# Patient Record
Sex: FEMALE | Race: WHITE | NOT HISPANIC OR LATINO | Employment: UNEMPLOYED | URBAN - METROPOLITAN AREA
[De-identification: names, ages, dates, MRNs, and addresses within clinical notes are randomized per-mention and may not be internally consistent; named-entity substitution may affect disease eponyms.]

---

## 2017-05-02 LAB — POC BETA-HCG (QUAL): NEGATIVE

## 2017-06-19 LAB — POC BETA-HCG (QUAL): NEGATIVE

## 2017-07-31 ENCOUNTER — HISTORICAL (OUTPATIENT)
Dept: LAB | Facility: HOSPITAL | Age: 32
End: 2017-07-31

## 2017-07-31 LAB
ABS NEUT (OLG): 2.31 X10(3)/MCL (ref 2.1–9.2)
ALBUMIN SERPL-MCNC: 4.1 GM/DL (ref 3.4–5)
ALBUMIN/GLOB SERPL: 1.4 {RATIO}
ALP SERPL-CCNC: 49 UNIT/L (ref 38–126)
ALT SERPL-CCNC: 17 UNIT/L (ref 12–78)
AST SERPL-CCNC: 8 UNIT/L (ref 15–37)
BASOPHILS # BLD AUTO: 0 X10(3)/MCL (ref 0–0.2)
BASOPHILS NFR BLD AUTO: 1 %
BILIRUB SERPL-MCNC: 0.4 MG/DL (ref 0.2–1)
BILIRUBIN DIRECT+TOT PNL SERPL-MCNC: 0.1 MG/DL (ref 0–0.2)
BILIRUBIN DIRECT+TOT PNL SERPL-MCNC: 0.3 MG/DL (ref 0–0.8)
BUN SERPL-MCNC: 14 MG/DL (ref 7–18)
CALCIUM SERPL-MCNC: 9.5 MG/DL (ref 8.5–10.1)
CHLORIDE SERPL-SCNC: 105 MMOL/L (ref 98–107)
CO2 SERPL-SCNC: 28 MMOL/L (ref 21–32)
CREAT SERPL-MCNC: 0.94 MG/DL (ref 0.55–1.02)
EOSINOPHIL # BLD AUTO: 0.3 X10(3)/MCL (ref 0–0.9)
EOSINOPHIL NFR BLD AUTO: 6 %
ERYTHROCYTE [DISTWIDTH] IN BLOOD BY AUTOMATED COUNT: 12.4 % (ref 11.5–17)
GLOBULIN SER-MCNC: 3 GM/DL (ref 2.4–3.5)
GLUCOSE SERPL-MCNC: 73 MG/DL (ref 74–106)
HCT VFR BLD AUTO: 40.6 % (ref 37–47)
HGB BLD-MCNC: 14.1 GM/DL (ref 12–16)
LYMPHOCYTES # BLD AUTO: 2 X10(3)/MCL (ref 0.6–4.6)
LYMPHOCYTES NFR BLD AUTO: 40 %
MCH RBC QN AUTO: 33.2 PG (ref 27–31)
MCHC RBC AUTO-ENTMCNC: 34.7 GM/DL (ref 33–36)
MCV RBC AUTO: 95.5 FL (ref 80–94)
MONOCYTES # BLD AUTO: 0.3 X10(3)/MCL (ref 0.1–1.3)
MONOCYTES NFR BLD AUTO: 7 %
NEUTROPHILS # BLD AUTO: 2.31 X10(3)/MCL (ref 2.1–9.2)
NEUTROPHILS NFR BLD AUTO: 47 %
PLATELET # BLD AUTO: 246 X10(3)/MCL (ref 130–400)
PMV BLD AUTO: 11.8 FL (ref 9.4–12.4)
POC BETA-HCG (QUAL): NEGATIVE
POTASSIUM SERPL-SCNC: 4.3 MMOL/L (ref 3.5–5.1)
PROT SERPL-MCNC: 7.1 GM/DL (ref 6.4–8.2)
RBC # BLD AUTO: 4.25 X10(6)/MCL (ref 4.2–5.4)
SODIUM SERPL-SCNC: 142 MMOL/L (ref 136–145)
TSH SERPL-ACNC: 0.53 MIU/ML (ref 0.36–3.74)
WBC # SPEC AUTO: 5 X10(3)/MCL (ref 4.5–11.5)

## 2017-08-08 ENCOUNTER — HISTORICAL (OUTPATIENT)
Dept: ADMINISTRATIVE | Facility: HOSPITAL | Age: 32
End: 2017-08-08

## 2017-08-08 LAB — GROUP & RH: NORMAL

## 2017-09-21 LAB — POC BETA-HCG (QUAL): NEGATIVE

## 2018-05-29 LAB — POC BETA-HCG (QUAL): NEGATIVE

## 2018-05-30 ENCOUNTER — HISTORICAL (OUTPATIENT)
Dept: LAB | Facility: HOSPITAL | Age: 33
End: 2018-05-30

## 2018-06-11 ENCOUNTER — HISTORICAL (OUTPATIENT)
Dept: RADIOLOGY | Facility: HOSPITAL | Age: 33
End: 2018-06-11

## 2018-06-18 LAB — POC BETA-HCG (QUAL): NEGATIVE

## 2019-02-07 ENCOUNTER — HISTORICAL (OUTPATIENT)
Dept: LAB | Facility: HOSPITAL | Age: 34
End: 2019-02-07

## 2019-02-07 LAB — POC BETA-HCG (QUAL): NEGATIVE

## 2019-02-12 ENCOUNTER — HISTORICAL (OUTPATIENT)
Dept: RADIOLOGY | Facility: HOSPITAL | Age: 34
End: 2019-02-12

## 2019-02-18 LAB — POC BETA-HCG (QUAL): NEGATIVE

## 2019-08-21 LAB
BILIRUB SERPL-MCNC: NEGATIVE MG/DL
BLOOD URINE, POC: NEGATIVE
GLUCOSE UR QL STRIP: NEGATIVE
KETONES UR QL STRIP: NEGATIVE
LEUKOCYTE EST, POC UA: NEGATIVE
NITRITE, POC UA: NEGATIVE
PH, POC UA: 6.5
POC BETA-HCG (QUAL): NEGATIVE
PROTEIN, POC: NEGATIVE
SPECIFIC GRAVITY, POC UA: 1.01
UROBILINOGEN, POC UA: NORMAL

## 2019-08-22 LAB — HUMAN PAPILLOMAVIRUS (HPV): NORMAL

## 2020-07-01 ENCOUNTER — HISTORICAL (OUTPATIENT)
Dept: LAB | Facility: HOSPITAL | Age: 35
End: 2020-07-01

## 2020-07-01 LAB
HAV IGM SERPL QL IA: NONREACTIVE
HBV CORE IGM SERPL QL IA: NONREACTIVE
HBV SURFACE AG SERPL QL IA: NONREACTIVE
HCV AB SERPL QL IA: NONREACTIVE
HEPATITIS PANEL INTERP: NORMAL
HIV 1+2 AB+HIV1 P24 AG SERPL QL IA: NONREACTIVE
POC BETA-HCG (QUAL): NEGATIVE
T PALLIDUM AB SER QL: NONREACTIVE

## 2022-04-11 ENCOUNTER — HISTORICAL (OUTPATIENT)
Dept: ADMINISTRATIVE | Facility: HOSPITAL | Age: 37
End: 2022-04-11

## 2022-04-26 VITALS
DIASTOLIC BLOOD PRESSURE: 62 MMHG | HEIGHT: 59 IN | WEIGHT: 106 LBS | SYSTOLIC BLOOD PRESSURE: 106 MMHG | BODY MASS INDEX: 21.37 KG/M2

## 2022-04-30 NOTE — H&P
HISTORY OF PRESENT ILLNESS:  This is a 32 year old  2, para 2, status post tubal ligation, who has a chief complaint of menorrhagia and heavy periods.  She has a desire for definitive therapy.  She states that since her tubal her periods have gotten worse.  She will be coming to [facility] to undergo diagnostic hysteroscopy, D&C procedure, NovaSure endometrial ablation.    SOCIAL HISTORY:  Notable for cigarette use.  She smokes a half a pack a day.  She is a social drinker.    FAMILY HISTORY:  Breast cancer in maternal aunt and great-grandmother, cervical cancer maternal great aunt, lung cancer maternal aunt, diabetes.    PAST SURGICAL HISTORY:  Tonsillectomy, neck lumpectomy, two  sections, bilateral tubal ligation.    OBSTETRICAL HISTORY:  Two pregnancies, both  sections.    GYNECOLOGICAL HISTORY:  Patient states that over the last year her periods have gotten worse, that her bleeding is becoming heavy, she is soaking through a super absorbent pad, and is afraid to wear light colored clothing during her periods.  She states that she wants to know what can be done and she states she is not interested in hormones.  Patient has opted for an ablation procedure.  She understands the alternatives of hormones versus hysterectomy.  She understands that an ablation has a specific risk.  Our conversation included, but was not limited, to the risk of bleeding and infection.  She will be coming to [facility] to undergo that.    REVIEW OF SYSTEMS:  Noncontributory.  Denies headaches, visual changes, chest pain, shortness of breath, nausea, vomiting, fever, chills, abdominal pain.  Reports dyspareunia and menorrhagia.  Denies depression.    PHYSICAL EXAMINATION:  VITAL SIGNS:  Stable.  Afebrile.   GENERAL:  Alert and oriented times three.   HEART:  Regular rate and rhythm.   LUNGS:  Clear to auscultation bilaterally.   ABDOMEN:  Soft.  Nontender and  nondistended.  No guarding or rebound.   :  Age appropriate vulva, vagina, urethral meatus.  Uterus small and mobile.  No adnexal masses are felt.  No cervical motion tenderness.     Preoperative labs and clearance was performed [facility].  CMP is within normal limits, thyroid studies normal, H&H 14.1 and 40.6.   Most recent PAP smear reviewed from May 2017 and it is normal.    ASSESSMENT/PLAN:  This is a 32 year old  2, para 2, who has menorrhagia symptomatic since tubal ligation, who has a desire to undergo definitive therapy.  Patient will be coming to [facility] same day surgery to undergo diagnostic hysteroscopy, D&C, NovaSure endometrial ablation.  She understands the surgery has major risks involved.  Our conversation included but was not limited to the risk of bleeding, infection, uterine perforation, need for hysterectomy, need for laparotomy, and blood transfusion.  40 to 45 minutes was taken for questions and answers.  Consents signed.  Urine pregnancy test day of surgery, SCDs on call to OR, consent for diagnostic hysteroscopy, D&C, ablation to chart, [facility] clearance on the chart, consent for blood on the chart.        ______________________________  MD DREW Pardo/ISIDRO  DD:  2017  Time:  03:43PM  DT:  2017  Time:  05:24AM  Job #:  [id number]    The H&P was reviewed, the patient was examined, and the following changes to the patients condition are noted:  ______________________________________________________________________________  ______________________________________________________________________________  ______________________________________________________________________________  [  ] No changes to the patient's condition:      ______________________________                                             ___________________  PHYSICIAN SIGNATURE                                                              DATE/TIME

## 2022-04-30 NOTE — OP NOTE
DATE OF SURGERY:    08/08/2017    SURGEON:  Regulo Lemus MD    PREOPERATIVE DIAGNOSIS:  A 32-year-old, multigravida female with symptomatic menorrhagia since having a tubal ligation.    POSTOPERATIVE DIAGNOSIS:  A 32-year-old, multigravida female with symptomatic menorrhagia since having a tubal ligation.    PROCEDURE:  Diagnostic hysteroscopy, D&C, NovaSure endometrial ablation.    ANESTHESIOLOGIST:  MD Amanda    ESTIMATED BLOOD LOSS:  Minimal - 20 ml.    FINDINGS:  8 week size mobile uterus anteverted.  No adnexal masses are felt.  Intrauterine findings include redundant endometrial tissue most likely proliferative in nature and benign appearing.  Age appropriate vulva and vagina.  Urine pregnancy test is negative on the day of surgery.  Cavity length is 4 cm and the width is 3.4 cm.  Cavity impendence testing was passed on the NovaSure device.  Urine is 100 ml clear urine straight cath.  Fluids are crystalloid 1 liter.  No antibiotics.    PROCEDURE IN DETAIL:  Risks, benefits and alternatives to this procedure were explained in detail to the patient.  She verbalized understanding and informed consents were obtained.  She was taken to the operating theater with intravenous fluids running, placed on the Operating Room table in dorsal supine position where general anesthesia was achieved without difficulty.  She was then placed in dorsal lithotomy position and prepped and draped in the usual sterile fashion for the procedure at hand.  The bladder was straight cath with clear urine noted 100 ml.  Bimanual exam performed and findings previously discussed were noted.  Graves speculum was placed in the vagina.  Cervix was visualized and grasped at twelve o'clock position with single-tooth tenaculum.  At this time, the uterus was sounded.  Cavity length was obtained.  Hanks dilators were used and the cervix was dilated to where I could then place the 5 mm Stokes hysteroscope under direct hysteroscopic  visualization into the uterine cavity.  Survey of the cavity revealed the findings previously discussed.  Hysteroscope was removed.  I then dilated the cervix with Hanks dilators to where I could then place the NovaSure device without difficulty.  The antenna was opened.  Cavity width was obtained after doing the up and down, left and right side to side motion.  The cervical collar was advanced.  The cavity impendence testing was performed.  Cavity impendence testing was passed and the device was enabled.  At this time, we fulgurated the cavity with the NovaSure device and the procedure was deemed complete.  At this time, the antenna was removed.  The tenaculum site was noted to be bleeding.  2-0 chromic suture was used in figure-of-eight fashion and hemostasis was achieved.  At this time, I watched the cervix for 2 minutes with no active bleeding.  The procedure was deemed over.  Speculum was removed from the patient's vagina.  The patient was cleaned.  Sponge, lap, instrument and needle counts were correct times two.  The patient was cleaned, awakened and taken to Recovery Room awake and in stable condition.        ______________________________  MD DREW Pardo/BRANDEE  DD:  08/08/2017  Time:  10:42AM  DT:  08/08/2017  Time:  04:06PM  Job #:  [id number]

## 2022-09-22 ENCOUNTER — HISTORICAL (OUTPATIENT)
Dept: ADMINISTRATIVE | Facility: HOSPITAL | Age: 37
End: 2022-09-22

## 2023-01-10 ENCOUNTER — OFFICE VISIT (OUTPATIENT)
Dept: FAMILY MEDICINE | Facility: CLINIC | Age: 38
End: 2023-01-10
Payer: MEDICAID

## 2023-01-10 VITALS
HEIGHT: 59 IN | RESPIRATION RATE: 16 BRPM | BODY MASS INDEX: 25.93 KG/M2 | WEIGHT: 128.63 LBS | SYSTOLIC BLOOD PRESSURE: 109 MMHG | DIASTOLIC BLOOD PRESSURE: 78 MMHG | HEART RATE: 88 BPM | OXYGEN SATURATION: 98 %

## 2023-01-10 DIAGNOSIS — F41.9 ANXIETY AND DEPRESSION: ICD-10-CM

## 2023-01-10 DIAGNOSIS — F32.A ANXIETY AND DEPRESSION: ICD-10-CM

## 2023-01-10 DIAGNOSIS — Z76.89 ENCOUNTER TO ESTABLISH CARE: Primary | ICD-10-CM

## 2023-01-10 PROCEDURE — 3074F SYST BP LT 130 MM HG: CPT | Mod: CPTII,,, | Performed by: NURSE PRACTITIONER

## 2023-01-10 PROCEDURE — 1159F PR MEDICATION LIST DOCUMENTED IN MEDICAL RECORD: ICD-10-PCS | Mod: CPTII,,, | Performed by: NURSE PRACTITIONER

## 2023-01-10 PROCEDURE — 3008F PR BODY MASS INDEX (BMI) DOCUMENTED: ICD-10-PCS | Mod: CPTII,,, | Performed by: NURSE PRACTITIONER

## 2023-01-10 PROCEDURE — 99203 OFFICE O/P NEW LOW 30 MIN: CPT | Mod: ,,, | Performed by: NURSE PRACTITIONER

## 2023-01-10 PROCEDURE — 3008F BODY MASS INDEX DOCD: CPT | Mod: CPTII,,, | Performed by: NURSE PRACTITIONER

## 2023-01-10 PROCEDURE — 3078F PR MOST RECENT DIASTOLIC BLOOD PRESSURE < 80 MM HG: ICD-10-PCS | Mod: CPTII,,, | Performed by: NURSE PRACTITIONER

## 2023-01-10 PROCEDURE — 1159F MED LIST DOCD IN RCRD: CPT | Mod: CPTII,,, | Performed by: NURSE PRACTITIONER

## 2023-01-10 PROCEDURE — 3074F PR MOST RECENT SYSTOLIC BLOOD PRESSURE < 130 MM HG: ICD-10-PCS | Mod: CPTII,,, | Performed by: NURSE PRACTITIONER

## 2023-01-10 PROCEDURE — 3078F DIAST BP <80 MM HG: CPT | Mod: CPTII,,, | Performed by: NURSE PRACTITIONER

## 2023-01-10 PROCEDURE — 99203 PR OFFICE/OUTPT VISIT, NEW, LEVL III, 30-44 MIN: ICD-10-PCS | Mod: ,,, | Performed by: NURSE PRACTITIONER

## 2023-01-10 RX ORDER — METHYLPHENIDATE HYDROCHLORIDE 72 MG/1
1 TABLET, EXTENDED RELEASE ORAL DAILY
COMMUNITY
Start: 2022-12-16

## 2023-01-10 RX ORDER — ACETAMINOPHEN 500 MG
5000 TABLET ORAL DAILY
COMMUNITY
End: 2024-02-15

## 2023-01-10 RX ORDER — ACETYLCYSTEINE 600 MG
1000 CAPSULE ORAL DAILY
COMMUNITY
End: 2024-02-15

## 2023-01-10 RX ORDER — CLONAZEPAM 0.5 MG/1
0.5 TABLET ORAL DAILY
COMMUNITY

## 2023-01-10 RX ORDER — METHYLPHENIDATE HYDROCHLORIDE 5 MG/1
1 TABLET ORAL 2 TIMES DAILY
COMMUNITY
Start: 2022-12-16

## 2023-01-10 RX ORDER — DESVENLAFAXINE 100 MG/1
1 TABLET, EXTENDED RELEASE ORAL DAILY
COMMUNITY
Start: 2022-12-12

## 2023-01-10 RX ORDER — CHOLECALCIFEROL (VITAMIN D3) 125 MCG
1 CAPSULE ORAL DAILY
COMMUNITY
End: 2024-02-15

## 2023-01-10 NOTE — PROGRESS NOTES
SUBJECTIVE:     History of Present Illness      Chief Complaint: Establish Care (No complaints at this time.  Previous PCP in [facility] at [facility].  Last seen June 2022 approximately, no lab drawn.)    HPI:  Patient is a 37 y.o. year old female who presents to clinic fto establish care as a new patient.    Last PCP at [place] [facility] years ago.   Past medical history depression, anxiety, ADHD.  Patient is followed by Psychiatry Maria Guadalupe Ahumada .  Patient reports no medication changes.    Review of Systems:  General: Denies fever, chills, fatigue, myalgias, and change in appetite   Eyes: Denies change in vision, eye redness, eye drainage, eye pain  ENT: Denies ear pain or pressure, rhinorrhea, nasal congestion, sore throat, and trouble swallowing  Resp: Denies wheezing, and shortness of breath   Cardio: Denies chest pain, palpitations, pleuritic pain, and edema   GI: Denies nausea, vomiting, diarrhea, and abdominal pain   : Denies dysuria, hematuria, and discharge   MSK: Denies trauma, joint pain, and trouble ambulating   Neuro: Denies LOC, dizziness, seizure like activity, and focal deficits   Skin: Denies rashes, open lesions and ulcers      Previous History      Review of patient's allergies indicates:  No Known Allergies    Past Medical History:   Diagnosis Date    ADHD (attention deficit hyperactivity disorder)     Anxiety     Bipolar disorder     Depression     Hypersomnia     PTSD (post-traumatic stress disorder)      Current Outpatient Medications   Medication Instructions    acetylcysteine (NAC) 1,000 mg, Oral, Daily    biotin 5,000 mcg TbDL 1 tablet, Oral, Daily    cholecalciferol (vitamin D3) (VITAMIN D3) 5,000 Units, Oral, Daily    clonazePAM (KLONOPIN) 0.5 MG tablet 0.5 tablets, Oral, Daily    desvenlafaxine succinate (PRISTIQ) 100 MG Tb24 1 tablet, Oral, Daily    methylphenidate HCl (RITALIN) 5 MG tablet 1 tablet, Oral, 2 times daily    methylphenidate HCl 72 mg TR24 1  "tablet, Oral, Daily     Past Surgical History:   Procedure Laterality Date    ADENOIDECTOMY       SECTION      CYST REMOVAL Right     right side of neck    TONSILLECTOMY      TUBAL LIGATION      uterine ablation N/A      Family History   Problem Relation Age of Onset    Hypertension Father        Social History     Tobacco Use    Smoking status: Former     Types: Cigarettes    Smokeless tobacco: Never   Substance Use Topics    Alcohol use: Yes     Comment: socially    Drug use: Never        Health Maintenance      Health Maintenance   Topic Date Due    Lipid Panel  Never done    TETANUS VACCINE  2024    Hepatitis C Screening  Completed       OBJECTIVE:     Physical Exam      Vital Signs Reviewed   /78   Pulse 88   Resp 16   Ht 4' 11" (1.499 m)   Wt 58.3 kg (128 lb 9.6 oz)   SpO2 98%   BMI 25.97 kg/m²     Physical Exam:  General: Alert, well nourished, no acute distress, non-toxic appearing.   Eyes: Anicteric sclera, without conjunctival injection, normal lids, no purulent drainage, EOMs grossly intact.   Ears: No tragal tenderness. Tympanic membranes intact, pearly grey, without effusion or erythema and with a positive light reflex.   Mouth: Posterior pharynx without erythema. No exudate, ulcerations, or lesion. No tonsillar swelling.   Neck: Supple, full ROM, no rigidity, no cervical adenopathy.   Cardio: Normal rate and rhythm    Resp: Respirations even and unlabored, clear to auscultation bilaterally.   Abd: No ecchymosis or distension. Normal bowel sounds in all 4 quadrants. No tenderness to palpation. No rebound tenderness or guarding. No CVA tenderness.   Skin: No rashes or open lesions noted.   MSK: No swelling. No abrasions or signs of trauma. Ambulating without assistance.   Neuro: Alert,oriented No focal deficits noted. Facial expressions even.   Psych: Cooperative, Normal affect      Procedures    Procedures     Labs     Results for orders placed or performed in visit on 22 "   POCT urine pregnancy   Result Value Ref Range    POC Beta-HCG (Qual) Negative         Assessment       1. Encounter to establish care    2. Anxiety and depression             Plan         1. Encounter to establish care  - CBC Auto Differential; Future  - Comprehensive Metabolic Panel; Future  - TSH; Future  - Lipid Panel; Future  - Vitamin D; Future  - Hemoglobin A1C; Future    2. Anxiety and depression  - CBC Auto Differential; Future  - Comprehensive Metabolic Panel; Future  - TSH; Future  - Lipid Panel; Future  - Vitamin D; Future  - Hemoglobin A1C; Future  Stable meds per psych denies SI/HI     Medication List with Changes/Refills   Current Medications    ACETYLCYSTEINE (NAC) 600 MG CAP    Take 1,000 mg by mouth once daily.    BIOTIN 5,000 MCG TBDL    Take 1 tablet by mouth once daily.    CHOLECALCIFEROL, VITAMIN D3, (VITAMIN D3) 125 MCG (5,000 UNIT) TAB    Take 5,000 Units by mouth once daily.    CLONAZEPAM (KLONOPIN) 0.5 MG TABLET    Take 0.5 tablets by mouth once daily.    DESVENLAFAXINE SUCCINATE (PRISTIQ) 100 MG TB24    Take 1 tablet by mouth once daily.    METHYLPHENIDATE HCL (RITALIN) 5 MG TABLET    Take 1 tablet by mouth 2 (two) times a day.    METHYLPHENIDATE HCL 72 MG TR24    Take 1 tablet by mouth once daily.           Future Appointments   Date Time Provider Department Center   2/10/2023  9:20 AM ELLA Cabello [facility]   5/16/2023  1:15 PM Regulo Lemus MD [facility]

## 2023-02-06 ENCOUNTER — LAB VISIT (OUTPATIENT)
Dept: LAB | Facility: HOSPITAL | Age: 38
End: 2023-02-06
Attending: NURSE PRACTITIONER
Payer: MEDICAID

## 2023-02-06 DIAGNOSIS — F41.9 ANXIETY AND DEPRESSION: ICD-10-CM

## 2023-02-06 DIAGNOSIS — Z76.89 ENCOUNTER TO ESTABLISH CARE: ICD-10-CM

## 2023-02-06 DIAGNOSIS — F32.A ANXIETY AND DEPRESSION: ICD-10-CM

## 2023-02-06 LAB
ALBUMIN SERPL-MCNC: 4.1 G/DL (ref 3.5–5)
ALBUMIN/GLOB SERPL: 1.5 RATIO (ref 1.1–2)
ALP SERPL-CCNC: 46 UNIT/L (ref 40–150)
ALT SERPL-CCNC: 14 UNIT/L (ref 0–55)
AST SERPL-CCNC: 14 UNIT/L (ref 5–34)
BASOPHILS # BLD AUTO: 0.05 X10(3)/MCL (ref 0–0.2)
BASOPHILS NFR BLD AUTO: 0.8 %
BILIRUBIN DIRECT+TOT PNL SERPL-MCNC: 0.5 MG/DL
BUN SERPL-MCNC: 11.6 MG/DL (ref 7–18.7)
CALCIUM SERPL-MCNC: 9.6 MG/DL (ref 8.4–10.2)
CHLORIDE SERPL-SCNC: 106 MMOL/L (ref 98–107)
CHOLEST SERPL-MCNC: 177 MG/DL
CHOLEST/HDLC SERPL: 3 {RATIO} (ref 0–5)
CO2 SERPL-SCNC: 25 MMOL/L (ref 22–29)
CREAT SERPL-MCNC: 0.71 MG/DL (ref 0.55–1.02)
DEPRECATED CALCIDIOL+CALCIFEROL SERPL-MC: 32.6 NG/ML (ref 30–80)
EOSINOPHIL # BLD AUTO: 0.44 X10(3)/MCL (ref 0–0.9)
EOSINOPHIL NFR BLD AUTO: 7.1 %
ERYTHROCYTE [DISTWIDTH] IN BLOOD BY AUTOMATED COUNT: 11.7 % (ref 11.5–17)
EST. AVERAGE GLUCOSE BLD GHB EST-MCNC: 76.7 MG/DL
GFR SERPLBLD CREATININE-BSD FMLA CKD-EPI: >60 MLS/MIN/1.73/M2
GLOBULIN SER-MCNC: 2.7 GM/DL (ref 2.4–3.5)
GLUCOSE SERPL-MCNC: 101 MG/DL (ref 74–100)
HBA1C MFR BLD: 4.3 %
HCT VFR BLD AUTO: 41.6 % (ref 37–47)
HDLC SERPL-MCNC: 65 MG/DL (ref 35–60)
HGB BLD-MCNC: 13.9 GM/DL (ref 12–16)
IMM GRANULOCYTES # BLD AUTO: 0.01 X10(3)/MCL (ref 0–0.04)
IMM GRANULOCYTES NFR BLD AUTO: 0.2 %
LDLC SERPL CALC-MCNC: 97 MG/DL (ref 50–140)
LYMPHOCYTES # BLD AUTO: 2.44 X10(3)/MCL (ref 0.6–4.6)
LYMPHOCYTES NFR BLD AUTO: 39.6 %
MCH RBC QN AUTO: 31.2 PG
MCHC RBC AUTO-ENTMCNC: 33.4 MG/DL (ref 33–36)
MCV RBC AUTO: 93.5 FL (ref 80–94)
MONOCYTES # BLD AUTO: 0.41 X10(3)/MCL (ref 0.1–1.3)
MONOCYTES NFR BLD AUTO: 6.7 %
NEUTROPHILS # BLD AUTO: 2.81 X10(3)/MCL (ref 2.1–9.2)
NEUTROPHILS NFR BLD AUTO: 45.6 %
PLATELET # BLD AUTO: 277 X10(3)/MCL (ref 130–400)
PMV BLD AUTO: 10.9 FL (ref 7.4–10.4)
POTASSIUM SERPL-SCNC: 3.9 MMOL/L (ref 3.5–5.1)
PROT SERPL-MCNC: 6.8 GM/DL (ref 6.4–8.3)
RBC # BLD AUTO: 4.45 X10(6)/MCL (ref 4.2–5.4)
SODIUM SERPL-SCNC: 140 MMOL/L (ref 136–145)
TRIGL SERPL-MCNC: 74 MG/DL (ref 37–140)
TSH SERPL-ACNC: 0.84 UIU/ML (ref 0.35–4.94)
VLDLC SERPL CALC-MCNC: 15 MG/DL
WBC # SPEC AUTO: 6.2 X10(3)/MCL (ref 4.5–11.5)

## 2023-02-06 PROCEDURE — 82306 VITAMIN D 25 HYDROXY: CPT

## 2023-02-06 PROCEDURE — 36415 COLL VENOUS BLD VENIPUNCTURE: CPT

## 2023-02-06 PROCEDURE — 80053 COMPREHEN METABOLIC PANEL: CPT

## 2023-02-06 PROCEDURE — 83036 HEMOGLOBIN GLYCOSYLATED A1C: CPT

## 2023-02-06 PROCEDURE — 84443 ASSAY THYROID STIM HORMONE: CPT

## 2023-02-06 PROCEDURE — 80061 LIPID PANEL: CPT

## 2023-02-06 PROCEDURE — 85025 COMPLETE CBC W/AUTO DIFF WBC: CPT

## 2023-02-10 ENCOUNTER — OFFICE VISIT (OUTPATIENT)
Dept: FAMILY MEDICINE | Facility: CLINIC | Age: 38
End: 2023-02-10
Payer: MEDICAID

## 2023-02-10 VITALS
RESPIRATION RATE: 16 BRPM | WEIGHT: 135 LBS | BODY MASS INDEX: 27.21 KG/M2 | DIASTOLIC BLOOD PRESSURE: 80 MMHG | HEIGHT: 59 IN | OXYGEN SATURATION: 100 % | HEART RATE: 84 BPM | SYSTOLIC BLOOD PRESSURE: 112 MMHG

## 2023-02-10 DIAGNOSIS — Z00.00 WELLNESS EXAMINATION: Primary | ICD-10-CM

## 2023-02-10 DIAGNOSIS — R53.83 FATIGUE, UNSPECIFIED TYPE: ICD-10-CM

## 2023-02-10 DIAGNOSIS — F41.9 ANXIETY AND DEPRESSION: ICD-10-CM

## 2023-02-10 DIAGNOSIS — F32.A ANXIETY AND DEPRESSION: ICD-10-CM

## 2023-02-10 PROCEDURE — 99395 PREV VISIT EST AGE 18-39: CPT | Mod: ,,, | Performed by: NURSE PRACTITIONER

## 2023-02-10 PROCEDURE — 3008F BODY MASS INDEX DOCD: CPT | Mod: CPTII,,, | Performed by: NURSE PRACTITIONER

## 2023-02-10 PROCEDURE — 3008F PR BODY MASS INDEX (BMI) DOCUMENTED: ICD-10-PCS | Mod: CPTII,,, | Performed by: NURSE PRACTITIONER

## 2023-02-10 PROCEDURE — 3079F DIAST BP 80-89 MM HG: CPT | Mod: CPTII,,, | Performed by: NURSE PRACTITIONER

## 2023-02-10 PROCEDURE — 3079F PR MOST RECENT DIASTOLIC BLOOD PRESSURE 80-89 MM HG: ICD-10-PCS | Mod: CPTII,,, | Performed by: NURSE PRACTITIONER

## 2023-02-10 PROCEDURE — 3074F PR MOST RECENT SYSTOLIC BLOOD PRESSURE < 130 MM HG: ICD-10-PCS | Mod: CPTII,,, | Performed by: NURSE PRACTITIONER

## 2023-02-10 PROCEDURE — 1160F RVW MEDS BY RX/DR IN RCRD: CPT | Mod: CPTII,,, | Performed by: NURSE PRACTITIONER

## 2023-02-10 PROCEDURE — 1160F PR REVIEW ALL MEDS BY PRESCRIBER/CLIN PHARMACIST DOCUMENTED: ICD-10-PCS | Mod: CPTII,,, | Performed by: NURSE PRACTITIONER

## 2023-02-10 PROCEDURE — 1159F MED LIST DOCD IN RCRD: CPT | Mod: CPTII,,, | Performed by: NURSE PRACTITIONER

## 2023-02-10 PROCEDURE — 1159F PR MEDICATION LIST DOCUMENTED IN MEDICAL RECORD: ICD-10-PCS | Mod: CPTII,,, | Performed by: NURSE PRACTITIONER

## 2023-02-10 PROCEDURE — 99395 PR PREVENTIVE VISIT,EST,18-39: ICD-10-PCS | Mod: ,,, | Performed by: NURSE PRACTITIONER

## 2023-02-10 PROCEDURE — 3074F SYST BP LT 130 MM HG: CPT | Mod: CPTII,,, | Performed by: NURSE PRACTITIONER

## 2023-02-10 NOTE — PROGRESS NOTES
SUBJECTIVE:     History of Present Illness      Chief Complaint: wellness (Discuss lab results.  No complaints at this time.)    HPI:  Patient is a 37 y.o. year old female who presents to clinic for annual wellness exam and lab review.  Past medical history depression, anxiety, ADHD.    Patient is followed by Psychiatry Maria Guadalupe Ahumada .    Patient reports no medication changes.  She states that her psychiatrist has reviewed her medication and she still very tired and fatigued all the time.  Patient is currently not working at this time.    Review of Systems:    Review of Systems    12 point review of systems conducted, negative except as stated in the history of present illness. See HPI for details.     Previous History      Review of patient's allergies indicates:   Allergen Reactions    Lamotrigine      Other reaction(s): Not Indicated    Buspar [buspirone] Palpitations       Past Medical History:   Diagnosis Date    ADHD (attention deficit hyperactivity disorder)     Anxiety     Bipolar disorder     Depression     Hypersomnia     PTSD (post-traumatic stress disorder)      Current Outpatient Medications   Medication Instructions    acetylcysteine 1,000 mg, Oral, Daily    biotin 5,000 mcg TbDL 1 tablet, Oral, Daily    cholecalciferol (vitamin D3) 5,000 Units, Oral, Daily    clonazePAM (KLONOPIN) 0.5 MG tablet 0.5 tablets, Oral, Daily    desvenlafaxine succinate (PRISTIQ) 100 MG Tb24 1 tablet, Oral, Daily    methylphenidate HCl (RITALIN) 5 MG tablet 1 tablet, Oral, 2 times daily    methylphenidate HCl 72 mg TR24 1 tablet, Oral, Daily     Past Surgical History:   Procedure Laterality Date    ADENOIDECTOMY       SECTION      CYST REMOVAL Right     right side of neck    TONSILLECTOMY      TUBAL LIGATION      uterine ablation N/A      Family History   Problem Relation Age of Onset    Hypertension Father        Social History     Tobacco Use    Smoking status: Former     Types: Cigarettes    Smokeless tobacco:  "Never   Substance Use Topics    Alcohol use: Yes     Comment: socially    Drug use: Never        Health Maintenance      Health Maintenance   Topic Date Due    TETANUS VACCINE  01/23/2024    Hepatitis C Screening  Completed    Lipid Panel  Completed       OBJECTIVE:     Physical Exam      Vital Signs Reviewed   Visit Vitals  /80   Pulse 84   Resp 16   Ht 4' 11" (1.499 m)   Wt 61.2 kg (135 lb)   SpO2 100%   BMI 27.27 kg/m²       Physical Exam    Physical Exam:  General: Alert, well nourished, no acute distress, non-toxic appearing.   Eyes: Anicteric sclera, without conjunctival injection, normal lids, no purulent drainage, EOMs grossly intact.   Ears: No tragal tenderness. Tympanic membranes intact, pearly grey, without effusion or erythema and with a positive light reflex.   Mouth: Posterior pharynx without erythema. No exudate, ulcerations, or lesion. No tonsillar swelling.   Neck: Supple, full ROM, no rigidity, no cervical adenopathy.   Cardio: Normal rate and rhythm    Resp: Respirations even and unlabored, clear to auscultation bilaterally.   Abd: No ecchymosis or distension. Normal bowel sounds in all 4 quadrants. No tenderness to palpation. No rebound tenderness or guarding. No CVA tenderness.   Skin: No rashes or open lesions noted.   MSK: No swelling. No abrasions or signs of trauma. Ambulating without assistance.   Neuro: Alert,oriented No focal deficits noted. Facial expressions even.   Psych: Cooperative, Normal affect      Procedures    Procedures     Labs     Results for orders placed or performed in visit on 02/06/23   Comprehensive Metabolic Panel   Result Value Ref Range    Sodium Level 140 136 - 145 mmol/L    Potassium Level 3.9 3.5 - 5.1 mmol/L    Chloride 106 98 - 107 mmol/L    Carbon Dioxide 25 22 - 29 mmol/L    Glucose Level 101 (H) 74 - 100 mg/dL    Blood Urea Nitrogen 11.6 7.0 - 18.7 mg/dL    Creatinine 0.71 0.55 - 1.02 mg/dL    Calcium Level Total 9.6 8.4 - 10.2 mg/dL    Protein Total " 6.8 6.4 - 8.3 gm/dL    Albumin Level 4.1 3.5 - 5.0 g/dL    Globulin 2.7 2.4 - 3.5 gm/dL    Albumin/Globulin Ratio 1.5 1.1 - 2.0 ratio    Bilirubin Total 0.5 <=1.5 mg/dL    Alkaline Phosphatase 46 40 - 150 unit/L    Alanine Aminotransferase 14 0 - 55 unit/L    Aspartate Aminotransferase 14 5 - 34 unit/L    eGFR >60 mls/min/1.73/m2   TSH   Result Value Ref Range    Thyroid Stimulating Hormone 0.843 0.350 - 4.940 uIU/mL   Lipid Panel   Result Value Ref Range    Cholesterol Total 177 <=200 mg/dL    HDL Cholesterol 65 (H) 35 - 60 mg/dL    Triglyceride 74 37 - 140 mg/dL    Cholesterol/HDL Ratio 3 0 - 5    Very Low Density Lipoprotein 15     LDL Cholesterol 97.00 50.00 - 140.00 mg/dL   Vitamin D   Result Value Ref Range    Vit D 25 OH 32.6 30.0 - 80.0 ng/mL   Hemoglobin A1C   Result Value Ref Range    Hemoglobin A1c 4.3 <=7.0 %    Estimated Average Glucose 76.7 mg/dL   CBC with Differential   Result Value Ref Range    WBC 6.2 4.5 - 11.5 x10(3)/mcL    RBC 4.45 4.20 - 5.40 x10(6)/mcL    Hgb 13.9 12.0 - 16.0 gm/dL    Hct 41.6 37.0 - 47.0 %    MCV 93.5 80.0 - 94.0 fL    MCH 31.2 pg    MCHC 33.4 33.0 - 36.0 mg/dL    RDW 11.7 11.5 - 17.0 %    Platelet 277 130 - 400 x10(3)/mcL    MPV 10.9 (H) 7.4 - 10.4 fL    Neut % 45.6 %    Lymph % 39.6 %    Mono % 6.7 %    Eos % 7.1 %    Basophil % 0.8 %    Lymph # 2.44 0.6 - 4.6 x10(3)/mcL    Neut # 2.81 2.1 - 9.2 x10(3)/mcL    Mono # 0.41 0.1 - 1.3 x10(3)/mcL    Eos # 0.44 0 - 0.9 x10(3)/mcL    Baso # 0.05 0 - 0.2 x10(3)/mcL    IG# 0.01 0 - 0.04 x10(3)/mcL    IG% 0.2 %       Chemistry:  Lab Results   Component Value Date     02/06/2023    K 3.9 02/06/2023    CHLORIDE 106 02/06/2023    BUN 11.6 02/06/2023    CREATININE 0.71 02/06/2023    EGFRNORACEVR >60 02/06/2023    GLUCOSE 101 (H) 02/06/2023    CALCIUM 9.6 02/06/2023    ALKPHOS 46 02/06/2023    LABPROT 6.8 02/06/2023    ALBUMIN 4.1 02/06/2023    BILIDIR 0.13 05/12/2018    IBILI 0.27 05/12/2018    AST 14 02/06/2023    ALT 14  02/06/2023    [id number] 32.6 02/06/2023    TSH 0.843 02/06/2023        Lab Results   Component Value Date    HGBA1C 4.3 02/06/2023        Hematology:  Lab Results   Component Value Date    WBC 6.2 02/06/2023    HGB 13.9 02/06/2023    HCT 41.6 02/06/2023     02/06/2023       Lipid Panel:  Lab Results   Component Value Date    CHOL 177 02/06/2023    HDL 65 (H) 02/06/2023    LDL 97.00 02/06/2023    TRIG 74 02/06/2023    TOTALCHOLEST 3 02/06/2023        Urine:  Lab Results   Component Value Date    APPEARANCEUA Clear 09/08/2018    PROTEINUA Negative 09/08/2018    LEUKOCYTESUR Negative 08/21/2019    RBCUA None Seen 09/08/2018    WBCUA None Seen 09/08/2018    BACTERIA None Seen 09/08/2018         Assessment       1. Wellness examination    2. Anxiety and depression    3. Fatigue, unspecified type           Plan       1. Wellness examination  - Ambulatory referral/consult to Cardiology; Future  Discussed labs and preventative screenings   Overall health status reviewed.    Significant chronic conditions addressed, including ongoing treatment plans.   Good health habits reinforced.    Cardiovascular disease risk factors discussed.   Appropriate recommendations and preventative care medical   information provided with annual wellness exams encouraged.  Vaccination status  Cervical  UTD - appointment with GYN     2. Anxiety and depression  Denies SI, HI hallucinations.  Continue current medication as prescribed by psychiatrist.  Establish good family social support.    3. Fatigue, unspecified type  Labs wnl    Refer to Cardio and GYN for further testing   - Ambulatory referral/consult to Cardiology; Future  Orders Placed This Encounter    Ambulatory referral/consult to Cardiology      Medication List with Changes/Refills   Current Medications    ACETYLCYSTEINE 600 MG CAP    Take 1,000 mg by mouth once daily.    BIOTIN 5,000 MCG TBDL    Take 1 tablet by mouth once daily.    CHOLECALCIFEROL, VITAMIN D3, 125 MCG (5,000  UNIT) TAB    Take 5,000 Units by mouth once daily.    CLONAZEPAM (KLONOPIN) 0.5 MG TABLET    Take 0.5 tablets by mouth once daily.    DESVENLAFAXINE SUCCINATE (PRISTIQ) 100 MG TB24    Take 1 tablet by mouth once daily.    METHYLPHENIDATE HCL (RITALIN) 5 MG TABLET    Take 1 tablet by mouth 2 (two) times a day.    METHYLPHENIDATE HCL 72 MG TR24    Take 1 tablet by mouth once daily.       Follow up in about 6 months (around 8/10/2023).     Future Appointments   Date Time Provider Department Center   5/16/2023  1:15 PM Regulo Lemus MD [facility]   8/15/2023  8:00 [facility]   2/12/2024  8:00 [facility]

## 2023-02-16 ENCOUNTER — PATIENT MESSAGE (OUTPATIENT)
Dept: FAMILY MEDICINE | Facility: CLINIC | Age: 38
End: 2023-02-16
Payer: MEDICAID

## 2023-02-23 RX ORDER — MUPIROCIN 20 MG/G
OINTMENT TOPICAL 3 TIMES DAILY
Qty: 15 G | Refills: 0 | Status: SHIPPED | OUTPATIENT
Start: 2023-02-23 | End: 2024-02-15

## 2023-03-02 ENCOUNTER — PATIENT MESSAGE (OUTPATIENT)
Dept: ADMINISTRATIVE | Facility: HOSPITAL | Age: 38
End: 2023-03-02
Payer: MEDICAID

## 2023-03-09 ENCOUNTER — OFFICE VISIT (OUTPATIENT)
Dept: FAMILY MEDICINE | Facility: CLINIC | Age: 38
End: 2023-03-09
Payer: MEDICAID

## 2023-03-09 VITALS
WEIGHT: 134 LBS | HEIGHT: 59 IN | BODY MASS INDEX: 27.01 KG/M2 | OXYGEN SATURATION: 97 % | HEART RATE: 68 BPM | RESPIRATION RATE: 18 BRPM | DIASTOLIC BLOOD PRESSURE: 83 MMHG | SYSTOLIC BLOOD PRESSURE: 119 MMHG

## 2023-03-09 DIAGNOSIS — R42 VERTIGO: Primary | ICD-10-CM

## 2023-03-09 DIAGNOSIS — H65.199 ACUTE MIDDLE EAR EFFUSION, UNSPECIFIED LATERALITY: ICD-10-CM

## 2023-03-09 PROCEDURE — 3074F PR MOST RECENT SYSTOLIC BLOOD PRESSURE < 130 MM HG: ICD-10-PCS | Mod: CPTII,,, | Performed by: NURSE PRACTITIONER

## 2023-03-09 PROCEDURE — 1159F PR MEDICATION LIST DOCUMENTED IN MEDICAL RECORD: ICD-10-PCS | Mod: CPTII,,, | Performed by: NURSE PRACTITIONER

## 2023-03-09 PROCEDURE — 99213 OFFICE O/P EST LOW 20 MIN: CPT | Mod: ,,, | Performed by: NURSE PRACTITIONER

## 2023-03-09 PROCEDURE — 99213 PR OFFICE/OUTPT VISIT, EST, LEVL III, 20-29 MIN: ICD-10-PCS | Mod: ,,, | Performed by: NURSE PRACTITIONER

## 2023-03-09 PROCEDURE — 3008F BODY MASS INDEX DOCD: CPT | Mod: CPTII,,, | Performed by: NURSE PRACTITIONER

## 2023-03-09 PROCEDURE — 1159F MED LIST DOCD IN RCRD: CPT | Mod: CPTII,,, | Performed by: NURSE PRACTITIONER

## 2023-03-09 PROCEDURE — 3008F PR BODY MASS INDEX (BMI) DOCUMENTED: ICD-10-PCS | Mod: CPTII,,, | Performed by: NURSE PRACTITIONER

## 2023-03-09 PROCEDURE — 3074F SYST BP LT 130 MM HG: CPT | Mod: CPTII,,, | Performed by: NURSE PRACTITIONER

## 2023-03-09 PROCEDURE — 3079F PR MOST RECENT DIASTOLIC BLOOD PRESSURE 80-89 MM HG: ICD-10-PCS | Mod: CPTII,,, | Performed by: NURSE PRACTITIONER

## 2023-03-09 PROCEDURE — 3079F DIAST BP 80-89 MM HG: CPT | Mod: CPTII,,, | Performed by: NURSE PRACTITIONER

## 2023-03-09 RX ORDER — AMOXICILLIN 875 MG/1
875 TABLET, FILM COATED ORAL EVERY 12 HOURS
Qty: 14 TABLET | Refills: 0 | Status: SHIPPED | OUTPATIENT
Start: 2023-03-09 | End: 2023-03-16

## 2023-03-09 RX ORDER — CETIRIZINE HYDROCHLORIDE 10 MG/1
10 TABLET ORAL DAILY
Qty: 30 TABLET | Refills: 5 | Status: SHIPPED | OUTPATIENT
Start: 2023-03-09 | End: 2024-02-15 | Stop reason: SDUPTHER

## 2023-03-09 RX ORDER — FLUTICASONE PROPIONATE 50 MCG
1 SPRAY, SUSPENSION (ML) NASAL DAILY
Qty: 16 G | Refills: 0 | Status: SHIPPED | OUTPATIENT
Start: 2023-03-09 | End: 2024-02-15 | Stop reason: SDUPTHER

## 2023-03-09 RX ORDER — TOPIRAMATE 25 MG/1
TABLET ORAL
COMMUNITY
Start: 2023-02-15

## 2023-03-09 RX ORDER — MECLIZINE HCL 12.5 MG 12.5 MG/1
12.5 TABLET ORAL 3 TIMES DAILY PRN
Qty: 15 TABLET | Refills: 0 | Status: SHIPPED | OUTPATIENT
Start: 2023-03-09 | End: 2024-02-15

## 2023-03-09 NOTE — PROGRESS NOTES
SUBJECTIVE:     History of Present Illness      Chief Complaint: Follow-up (Follow-up visit . Pt c/o vertigo, bilateral eye pain and pressure. BL ear pressure. X 1 month and gradually got worse this past weekend.)    HPI:  Patient is a 37 y.o. year old female who presents to clinic for c/o dizziness. She states when she bends her head or turn her head she gets dizzy.   Denies N/V  blurred vision, CP or SOB    She desribeds it as ears feeling full with fluid     Review of Systems:    Review of Systems    12 point review of systems conducted, negative except as stated in the history of present illness. See HPI for details.     Previous History      Review of patient's allergies indicates:   Allergen Reactions    Lamotrigine      Other reaction(s): Not Indicated    Buspar [buspirone] Palpitations       Past Medical History:   Diagnosis Date    ADHD (attention deficit hyperactivity disorder)     Anxiety     Bipolar disorder     Depression     Hypersomnia     PTSD (post-traumatic stress disorder)      Current Outpatient Medications   Medication Instructions    acetylcysteine 1,000 mg, Oral, Daily    amoxicillin (AMOXIL) 875 mg, Oral, Every 12 hours    biotin 5,000 mcg TbDL 1 tablet, Oral, Daily    cetirizine (ZYRTEC) 10 mg, Oral, Daily    cholecalciferol (vitamin D3) 5,000 Units, Oral, Daily    clonazePAM (KLONOPIN) 0.5 MG tablet 0.5 tablets, Oral, Daily    desvenlafaxine succinate (PRISTIQ) 100 MG Tb24 1 tablet, Oral, Daily    fluticasone propionate (FLONASE) 50 mcg, Each Nostril, Daily    meclizine (ANTIVERT) 12.5 mg, Oral, 3 times daily PRN    methylphenidate HCl (RITALIN) 5 MG tablet 1 tablet, Oral, 2 times daily    methylphenidate HCl 72 mg TR24 1 tablet, Oral, Daily    mupirocin (BACTROBAN) 2 % ointment Topical (Top), 3 times daily    topiramate (TOPAMAX) 25 MG tablet No dose, route, or frequency recorded.     Past Surgical History:   Procedure Laterality Date    ADENOIDECTOMY       SECTION      CYST  "REMOVAL Right     right side of neck    TONSILLECTOMY      TUBAL LIGATION      uterine ablation N/A      Family History   Problem Relation Age of Onset    Hypertension Father        Social History     Tobacco Use    Smoking status: Former     Types: Cigarettes    Smokeless tobacco: Never   Substance Use Topics    Alcohol use: Yes     Comment: socially    Drug use: Never        Health Maintenance      Health Maintenance   Topic Date Due    TETANUS VACCINE  01/23/2024    Hepatitis C Screening  Completed    Lipid Panel  Completed       OBJECTIVE:     Physical Exam      Vital Signs Reviewed   Visit Vitals  /83   Pulse 68   Resp 18   Ht 4' 11" (1.499 m)   Wt 60.8 kg (134 lb)   SpO2 97%   BMI 27.06 kg/m²       Physical Exam  HENT:      Right Ear: A middle ear effusion is present.      Left Ear: A middle ear effusion is present.       Physical Exam:  General: Alert, well nourished, no acute distress, non-toxic appearing.   Eyes: Anicteric sclera, without conjunctival injection, normal lids, no purulent drainage, EOMs grossly intact.   Ears: No tragal tenderness. Tympanic membranes intact, pearly grey, without effusion or erythema and with a positive light reflex.   Mouth: Posterior pharynx without erythema. No exudate, ulcerations, or lesion. No tonsillar swelling.   Neck: Supple, full ROM, no rigidity, no cervical adenopathy.   Cardio: Normal rate and rhythm    Resp: Respirations even and unlabored, clear to auscultation bilaterally.   Abd: No ecchymosis or distension. Normal bowel sounds in all 4 quadrants. No tenderness to palpation. No rebound tenderness or guarding. No CVA tenderness.   Skin: No rashes or open lesions noted.   MSK: No swelling. No abrasions or signs of trauma. Ambulating without assistance.   Neuro: Alert,oriented No focal deficits noted. Facial expressions even.   Psych: Cooperative, Normal affect      Procedures    Procedures     Labs     Results for orders placed or performed in visit on " 02/06/23   Comprehensive Metabolic Panel   Result Value Ref Range    Sodium Level 140 136 - 145 mmol/L    Potassium Level 3.9 3.5 - 5.1 mmol/L    Chloride 106 98 - 107 mmol/L    Carbon Dioxide 25 22 - 29 mmol/L    Glucose Level 101 (H) 74 - 100 mg/dL    Blood Urea Nitrogen 11.6 7.0 - 18.7 mg/dL    Creatinine 0.71 0.55 - 1.02 mg/dL    Calcium Level Total 9.6 8.4 - 10.2 mg/dL    Protein Total 6.8 6.4 - 8.3 gm/dL    Albumin Level 4.1 3.5 - 5.0 g/dL    Globulin 2.7 2.4 - 3.5 gm/dL    Albumin/Globulin Ratio 1.5 1.1 - 2.0 ratio    Bilirubin Total 0.5 <=1.5 mg/dL    Alkaline Phosphatase 46 40 - 150 unit/L    Alanine Aminotransferase 14 0 - 55 unit/L    Aspartate Aminotransferase 14 5 - 34 unit/L    eGFR >60 mls/min/1.73/m2   TSH   Result Value Ref Range    Thyroid Stimulating Hormone 0.843 0.350 - 4.940 uIU/mL   Lipid Panel   Result Value Ref Range    Cholesterol Total 177 <=200 mg/dL    HDL Cholesterol 65 (H) 35 - 60 mg/dL    Triglyceride 74 37 - 140 mg/dL    Cholesterol/HDL Ratio 3 0 - 5    Very Low Density Lipoprotein 15     LDL Cholesterol 97.00 50.00 - 140.00 mg/dL   Vitamin D   Result Value Ref Range    Vit D 25 OH 32.6 30.0 - 80.0 ng/mL   Hemoglobin A1C   Result Value Ref Range    Hemoglobin A1c 4.3 <=7.0 %    Estimated Average Glucose 76.7 mg/dL   CBC with Differential   Result Value Ref Range    WBC 6.2 4.5 - 11.5 x10(3)/mcL    RBC 4.45 4.20 - 5.40 x10(6)/mcL    Hgb 13.9 12.0 - 16.0 gm/dL    Hct 41.6 37.0 - 47.0 %    MCV 93.5 80.0 - 94.0 fL    MCH 31.2 pg    MCHC 33.4 33.0 - 36.0 mg/dL    RDW 11.7 11.5 - 17.0 %    Platelet 277 130 - 400 x10(3)/mcL    MPV 10.9 (H) 7.4 - 10.4 fL    Neut % 45.6 %    Lymph % 39.6 %    Mono % 6.7 %    Eos % 7.1 %    Basophil % 0.8 %    Lymph # 2.44 0.6 - 4.6 x10(3)/mcL    Neut # 2.81 2.1 - 9.2 x10(3)/mcL    Mono # 0.41 0.1 - 1.3 x10(3)/mcL    Eos # 0.44 0 - 0.9 x10(3)/mcL    Baso # 0.05 0 - 0.2 x10(3)/mcL    IG# 0.01 0 - 0.04 x10(3)/mcL    IG% 0.2 %       Chemistry:  Lab Results    Component Value Date     02/06/2023    K 3.9 02/06/2023    CHLORIDE 106 02/06/2023    BUN 11.6 02/06/2023    CREATININE 0.71 02/06/2023    EGFRNORACEVR >60 02/06/2023    GLUCOSE 101 (H) 02/06/2023    CALCIUM 9.6 02/06/2023    ALKPHOS 46 02/06/2023    LABPROT 6.8 02/06/2023    ALBUMIN 4.1 02/06/2023    BILIDIR 0.13 05/12/2018    IBILI 0.27 05/12/2018    AST 14 02/06/2023    ALT 14 02/06/2023    [id number] 32.6 02/06/2023    TSH 0.843 02/06/2023        Lab Results   Component Value Date    HGBA1C 4.3 02/06/2023        Hematology:  Lab Results   Component Value Date    WBC 6.2 02/06/2023    HGB 13.9 02/06/2023    HCT 41.6 02/06/2023     02/06/2023       Lipid Panel:  Lab Results   Component Value Date    CHOL 177 02/06/2023    HDL 65 (H) 02/06/2023    LDL 97.00 02/06/2023    TRIG 74 02/06/2023    TOTALCHOLEST 3 02/06/2023        Urine:  Lab Results   Component Value Date    APPEARANCEUA Clear 09/08/2018    PROTEINUA Negative 09/08/2018    LEUKOCYTESUR Negative 08/21/2019    RBCUA None Seen 09/08/2018    WBCUA None Seen 09/08/2018    BACTERIA None Seen 09/08/2018         Assessment       1. Vertigo    2. Acute middle ear effusion, unspecified laterality           Plan       1. Vertigo  Meclizine  PRN   2. Acute middle ear effusion, unspecified laterality  Ceterizne   Amoxacillin   Orders Placed This Encounter    cetirizine (ZYRTEC) 10 MG tablet    meclizine (ANTIVERT) 12.5 mg tablet    fluticasone propionate (FLONASE) 50 mcg/actuation nasal spray    amoxicillin (AMOXIL) 875 MG tablet      Medication List with Changes/Refills   New Medications    AMOXICILLIN (AMOXIL) 875 MG TABLET    Take 1 tablet (875 mg total) by mouth every 12 (twelve) hours. for 7 days    CETIRIZINE (ZYRTEC) 10 MG TABLET    Take 1 tablet (10 mg total) by mouth once daily.    FLUTICASONE PROPIONATE (FLONASE) 50 MCG/ACTUATION NASAL SPRAY    1 spray (50 mcg total) by Each Nostril route once daily.    MECLIZINE (ANTIVERT) 12.5 MG TABLET     Take 1 tablet (12.5 mg total) by mouth 3 (three) times daily as needed for Dizziness.   Current Medications    ACETYLCYSTEINE 600 MG CAP    Take 1,000 mg by mouth once daily.    BIOTIN 5,000 MCG TBDL    Take 1 tablet by mouth once daily.    CHOLECALCIFEROL, VITAMIN D3, 125 MCG (5,000 UNIT) TAB    Take 5,000 Units by mouth once daily.    CLONAZEPAM (KLONOPIN) 0.5 MG TABLET    Take 0.5 tablets by mouth once daily.    DESVENLAFAXINE SUCCINATE (PRISTIQ) 100 MG TB24    Take 1 tablet by mouth once daily.    METHYLPHENIDATE HCL (RITALIN) 5 MG TABLET    Take 1 tablet by mouth 2 (two) times a day.    METHYLPHENIDATE HCL 72 MG TR24    Take 1 tablet by mouth once daily.    MUPIROCIN (BACTROBAN) 2 % OINTMENT    Apply topically 3 (three) times daily.    TOPIRAMATE (TOPAMAX) 25 MG TABLET           No follow-ups on file.     Future Appointments   Date Time Provider Department Center   5/16/2023  1:15 PM Regulo Lemus MD [facility]   8/15/2023  8:00 AM ELLA Cabello [facility]   2/12/2024  8:00 [facility]

## 2023-03-15 ENCOUNTER — PATIENT MESSAGE (OUTPATIENT)
Dept: ADMINISTRATIVE | Facility: HOSPITAL | Age: 38
End: 2023-03-15
Payer: MEDICAID

## 2023-03-22 ENCOUNTER — PATIENT OUTREACH (OUTPATIENT)
Dept: ADMINISTRATIVE | Facility: HOSPITAL | Age: 38
End: 2023-03-22
Payer: MEDICAID

## 2023-03-22 NOTE — PROGRESS NOTES
Population Health Outreach. The following record(s)  below were uploaded for Health Maintenance .    HPV SCREENING 08/22/2019   English

## 2023-05-23 ENCOUNTER — HOSPITAL ENCOUNTER (OUTPATIENT)
Dept: RADIOLOGY | Facility: HOSPITAL | Age: 38
Discharge: HOME | End: 2023-05-23
Attending: OBSTETRICS & GYNECOLOGY
Payer: MEDICAID

## 2023-05-23 DIAGNOSIS — Z12.31 ENCOUNTER FOR SCREENING MAMMOGRAM FOR MALIGNANT NEOPLASM OF BREAST: ICD-10-CM

## 2023-05-23 PROCEDURE — 77063 MAMMO DIGITAL SCREENING BILAT WITH TOMO: ICD-10-PCS | Mod: 26,,, | Performed by: RADIOLOGY

## 2023-05-23 PROCEDURE — 77067 SCR MAMMO BI INCL CAD: CPT | Mod: 26,,, | Performed by: RADIOLOGY

## 2023-05-23 PROCEDURE — 77063 BREAST TOMOSYNTHESIS BI: CPT | Mod: 26,,, | Performed by: RADIOLOGY

## 2023-05-23 PROCEDURE — 77067 SCR MAMMO BI INCL CAD: CPT | Mod: TC

## 2023-05-23 PROCEDURE — 77067 MAMMO DIGITAL SCREENING BILAT WITH TOMO: ICD-10-PCS | Mod: 26,,, | Performed by: RADIOLOGY

## 2023-06-01 ENCOUNTER — HOSPITAL ENCOUNTER (OUTPATIENT)
Dept: RADIOLOGY | Facility: HOSPITAL | Age: 38
Discharge: HOME | End: 2023-06-01
Attending: OBSTETRICS & GYNECOLOGY
Payer: MEDICAID

## 2023-06-01 DIAGNOSIS — R92.8 ABNORMAL SCREENING MAMMOGRAM: ICD-10-CM

## 2023-06-01 PROCEDURE — 77066 DX MAMMO INCL CAD BI: CPT | Mod: 26,,, | Performed by: STUDENT IN AN ORGANIZED HEALTH CARE EDUCATION/TRAINING PROGRAM

## 2023-06-01 PROCEDURE — 76642 ULTRASOUND BREAST LIMITED: CPT | Mod: TC,50

## 2023-06-01 PROCEDURE — 76642 US BREAST BILATERAL LIMITED: ICD-10-PCS | Mod: 26,50,, | Performed by: STUDENT IN AN ORGANIZED HEALTH CARE EDUCATION/TRAINING PROGRAM

## 2023-06-01 PROCEDURE — 77062 MAMMO DIGITAL DIAGNOSTIC BILAT WITH TOMO: ICD-10-PCS | Mod: 26,,, | Performed by: STUDENT IN AN ORGANIZED HEALTH CARE EDUCATION/TRAINING PROGRAM

## 2023-06-01 PROCEDURE — 77066 MAMMO DIGITAL DIAGNOSTIC BILAT WITH TOMO: ICD-10-PCS | Mod: 26,,, | Performed by: STUDENT IN AN ORGANIZED HEALTH CARE EDUCATION/TRAINING PROGRAM

## 2023-06-01 PROCEDURE — 77062 BREAST TOMOSYNTHESIS BI: CPT | Mod: 26,,, | Performed by: STUDENT IN AN ORGANIZED HEALTH CARE EDUCATION/TRAINING PROGRAM

## 2023-06-01 PROCEDURE — 77066 DX MAMMO INCL CAD BI: CPT | Mod: TC

## 2023-06-01 PROCEDURE — 76642 ULTRASOUND BREAST LIMITED: CPT | Mod: 26,50,, | Performed by: STUDENT IN AN ORGANIZED HEALTH CARE EDUCATION/TRAINING PROGRAM

## 2024-01-30 DIAGNOSIS — Z00.00 WELLNESS EXAMINATION: Primary | ICD-10-CM

## 2024-02-12 ENCOUNTER — LAB VISIT (OUTPATIENT)
Dept: LAB | Facility: HOSPITAL | Age: 39
End: 2024-02-12
Attending: NURSE PRACTITIONER
Payer: MEDICAID

## 2024-02-12 DIAGNOSIS — Z00.00 WELLNESS EXAMINATION: ICD-10-CM

## 2024-02-12 LAB
ALBUMIN SERPL-MCNC: 4.1 G/DL (ref 3.5–5)
ALBUMIN/GLOB SERPL: 1.5 RATIO (ref 1.1–2)
ALP SERPL-CCNC: 48 UNIT/L (ref 40–150)
ALT SERPL-CCNC: 11 UNIT/L (ref 0–55)
AST SERPL-CCNC: 14 UNIT/L (ref 5–34)
BASOPHILS # BLD AUTO: 0.04 X10(3)/MCL
BASOPHILS NFR BLD AUTO: 0.7 %
BILIRUB SERPL-MCNC: 0.6 MG/DL
BUN SERPL-MCNC: 9.3 MG/DL (ref 7–18.7)
CALCIUM SERPL-MCNC: 9.2 MG/DL (ref 8.4–10.2)
CHLORIDE SERPL-SCNC: 105 MMOL/L (ref 98–107)
CHOLEST SERPL-MCNC: 157 MG/DL
CHOLEST/HDLC SERPL: 3 {RATIO} (ref 0–5)
CO2 SERPL-SCNC: 23 MMOL/L (ref 22–29)
CREAT SERPL-MCNC: 0.72 MG/DL (ref 0.55–1.02)
DEPRECATED CALCIDIOL+CALCIFEROL SERPL-MC: 26.1 NG/ML (ref 30–80)
EOSINOPHIL # BLD AUTO: 0.4 X10(3)/MCL (ref 0–0.9)
EOSINOPHIL NFR BLD AUTO: 6.9 %
ERYTHROCYTE [DISTWIDTH] IN BLOOD BY AUTOMATED COUNT: 11.9 % (ref 11.5–17)
EST. AVERAGE GLUCOSE BLD GHB EST-MCNC: 79.6 MG/DL
GFR SERPLBLD CREATININE-BSD FMLA CKD-EPI: >60 MLS/MIN/1.73/M2
GLOBULIN SER-MCNC: 2.8 GM/DL (ref 2.4–3.5)
GLUCOSE SERPL-MCNC: 90 MG/DL (ref 74–100)
HBA1C MFR BLD: 4.4 %
HCT VFR BLD AUTO: 40.8 % (ref 37–47)
HDLC SERPL-MCNC: 56 MG/DL (ref 35–60)
HGB BLD-MCNC: 13.9 G/DL (ref 12–16)
IMM GRANULOCYTES # BLD AUTO: 0.02 X10(3)/MCL (ref 0–0.04)
IMM GRANULOCYTES NFR BLD AUTO: 0.3 %
LDLC SERPL CALC-MCNC: 85 MG/DL (ref 50–140)
LYMPHOCYTES # BLD AUTO: 2.03 X10(3)/MCL (ref 0.6–4.6)
LYMPHOCYTES NFR BLD AUTO: 34.9 %
MCH RBC QN AUTO: 31.9 PG (ref 27–31)
MCHC RBC AUTO-ENTMCNC: 34.1 G/DL (ref 33–36)
MCV RBC AUTO: 93.6 FL (ref 80–94)
MONOCYTES # BLD AUTO: 0.28 X10(3)/MCL (ref 0.1–1.3)
MONOCYTES NFR BLD AUTO: 4.8 %
NEUTROPHILS # BLD AUTO: 3.04 X10(3)/MCL (ref 2.1–9.2)
NEUTROPHILS NFR BLD AUTO: 52.4 %
PLATELET # BLD AUTO: 261 X10(3)/MCL (ref 130–400)
PMV BLD AUTO: 11.2 FL (ref 7.4–10.4)
POTASSIUM SERPL-SCNC: 3.4 MMOL/L (ref 3.5–5.1)
PROT SERPL-MCNC: 6.9 GM/DL (ref 6.4–8.3)
RBC # BLD AUTO: 4.36 X10(6)/MCL (ref 4.2–5.4)
SODIUM SERPL-SCNC: 139 MMOL/L (ref 136–145)
TRIGL SERPL-MCNC: 80 MG/DL (ref 37–140)
TSH SERPL-ACNC: 0.76 UIU/ML (ref 0.35–4.94)
VLDLC SERPL CALC-MCNC: 16 MG/DL
WBC # SPEC AUTO: 5.81 X10(3)/MCL (ref 4.5–11.5)

## 2024-02-12 PROCEDURE — 80053 COMPREHEN METABOLIC PANEL: CPT

## 2024-02-12 PROCEDURE — 83036 HEMOGLOBIN GLYCOSYLATED A1C: CPT

## 2024-02-12 PROCEDURE — 80061 LIPID PANEL: CPT

## 2024-02-12 PROCEDURE — 84443 ASSAY THYROID STIM HORMONE: CPT

## 2024-02-12 PROCEDURE — 82306 VITAMIN D 25 HYDROXY: CPT

## 2024-02-12 PROCEDURE — 36415 COLL VENOUS BLD VENIPUNCTURE: CPT

## 2024-02-12 PROCEDURE — 85025 COMPLETE CBC W/AUTO DIFF WBC: CPT

## 2024-02-15 ENCOUNTER — OFFICE VISIT (OUTPATIENT)
Dept: FAMILY MEDICINE | Facility: CLINIC | Age: 39
End: 2024-02-15
Payer: MEDICAID

## 2024-02-15 ENCOUNTER — PATIENT MESSAGE (OUTPATIENT)
Dept: FAMILY MEDICINE | Facility: CLINIC | Age: 39
End: 2024-02-15

## 2024-02-15 VITALS
HEART RATE: 94 BPM | DIASTOLIC BLOOD PRESSURE: 80 MMHG | OXYGEN SATURATION: 100 % | SYSTOLIC BLOOD PRESSURE: 115 MMHG | WEIGHT: 129.31 LBS | TEMPERATURE: 98 F | BODY MASS INDEX: 26.07 KG/M2

## 2024-02-15 DIAGNOSIS — Z00.00 WELLNESS EXAMINATION: Primary | ICD-10-CM

## 2024-02-15 DIAGNOSIS — F90.9 ATTENTION DEFICIT HYPERACTIVITY DISORDER (ADHD), UNSPECIFIED ADHD TYPE: ICD-10-CM

## 2024-02-15 DIAGNOSIS — G62.9 NEUROPATHY: ICD-10-CM

## 2024-02-15 DIAGNOSIS — J30.2 SEASONAL ALLERGIES: ICD-10-CM

## 2024-02-15 DIAGNOSIS — F32.A DEPRESSION, UNSPECIFIED DEPRESSION TYPE: ICD-10-CM

## 2024-02-15 PROCEDURE — 3008F BODY MASS INDEX DOCD: CPT | Mod: CPTII,,, | Performed by: NURSE PRACTITIONER

## 2024-02-15 PROCEDURE — 1159F MED LIST DOCD IN RCRD: CPT | Mod: CPTII,,, | Performed by: NURSE PRACTITIONER

## 2024-02-15 PROCEDURE — 3044F HG A1C LEVEL LT 7.0%: CPT | Mod: CPTII,,, | Performed by: NURSE PRACTITIONER

## 2024-02-15 PROCEDURE — 99396 PREV VISIT EST AGE 40-64: CPT | Mod: ,,, | Performed by: NURSE PRACTITIONER

## 2024-02-15 PROCEDURE — 3079F DIAST BP 80-89 MM HG: CPT | Mod: CPTII,,, | Performed by: NURSE PRACTITIONER

## 2024-02-15 PROCEDURE — 3074F SYST BP LT 130 MM HG: CPT | Mod: CPTII,,, | Performed by: NURSE PRACTITIONER

## 2024-02-15 RX ORDER — FLUTICASONE PROPIONATE 50 MCG
1 SPRAY, SUSPENSION (ML) NASAL DAILY
Qty: 16 G | Refills: 0 | Status: SHIPPED | OUTPATIENT
Start: 2024-02-15

## 2024-02-15 RX ORDER — GABAPENTIN 100 MG/1
100 CAPSULE ORAL 3 TIMES DAILY
Qty: 90 CAPSULE | Refills: 2 | Status: SHIPPED | OUTPATIENT
Start: 2024-02-15 | End: 2024-03-28 | Stop reason: SDUPTHER

## 2024-02-15 RX ORDER — CETIRIZINE HYDROCHLORIDE 10 MG/1
10 TABLET ORAL DAILY
Qty: 30 TABLET | Refills: 5 | Status: SHIPPED | OUTPATIENT
Start: 2024-02-15 | End: 2025-02-14

## 2024-02-15 NOTE — PROGRESS NOTES
SUBJECTIVE:     History of Present Illness      Chief Complaint: wellness with lab review    HPI:  Patient is a 38 y.o. year old female who presents to clinic for annual wellness and lab review.  Today patient is complaining numbness and tingling to her right thigh area for the past 4 weeks.  Patient states she also has the same feeling to her left shoulders and bilateral hands.  Patient is currently taking B12 injections weekly to the right thigh    Review of Systems:    Review of Systems    12 point review of systems conducted, negative except as stated in the history of present illness. See HPI for details.     Previous History    Rj Lieberman, JEANETTEP  Review of patient's allergies indicates:   Allergen Reactions    Lamotrigine      Other reaction(s): Not Indicated    Buspar [buspirone] Palpitations       Past Medical History:   Diagnosis Date    ADHD (attention deficit hyperactivity disorder)     Anxiety     Bipolar disorder     Depression     Hypersomnia     PTSD (post-traumatic stress disorder)      Current Outpatient Medications   Medication Instructions    cetirizine (ZYRTEC) 10 mg, Oral, Daily    clonazePAM (KLONOPIN) 0.5 MG tablet 0.5 tablets, Oral, Daily    desvenlafaxine succinate (PRISTIQ) 100 MG Tb24 1 tablet, Oral, Daily    fluticasone propionate (FLONASE) 50 mcg, Each Nostril, Daily    gabapentin (NEURONTIN) 100 mg, Oral, 3 times daily    methylphenidate HCl (RITALIN) 5 MG tablet 1 tablet, Oral, 2 times daily    methylphenidate HCl 72 mg TR24 1 tablet, Oral, Daily    topiramate (TOPAMAX) 25 MG tablet No dose, route, or frequency recorded.     Past Surgical History:   Procedure Laterality Date    ADENOIDECTOMY       SECTION      CYST REMOVAL Right     right side of neck    TONSILLECTOMY      TUBAL LIGATION      uterine ablation N/A      Family History   Problem Relation Age of Onset    Hypertension Father        Social History     Tobacco Use    Smoking status: Former     Types: Cigarettes     Smokeless tobacco: Never   Substance Use Topics    Alcohol use: Yes     Comment: socially    Drug use: Never        Health Maintenance      Health Maintenance   Topic Date Due    TETANUS VACCINE  01/23/2024    Mammogram  06/01/2024    Hepatitis C Screening  Completed    Lipid Panel  Completed       OBJECTIVE:     Physical Exam      Vital Signs Reviewed   Visit Vitals  /80   Pulse 94   Temp 98 °F (36.7 °C)   Wt 58.7 kg (129 lb 4.8 oz)   SpO2 100%   BMI 26.07 kg/m²       Physical Exam    Physical Exam:  General: Alert, well nourished, no acute distress, non-toxic appearing.   Eyes: Anicteric sclera, without conjunctival injection, normal lids, no purulent drainage, EOMs grossly intact.   Ears: No tragal tenderness. Tympanic membranes intact, pearly grey, without effusion or erythema and with a positive light reflex.   Mouth: Posterior pharynx without erythema. No exudate, ulcerations, or lesion. No tonsillar swelling.   Neck: Supple, full ROM, no rigidity, no cervical adenopathy.   Cardio: Normal rate and rhythm    Resp: Respirations even and unlabored, clear to auscultation bilaterally.   Abd: No ecchymosis or distension. Normal bowel sounds in all 4 quadrants. No tenderness to palpation. No rebound tenderness or guarding. No CVA tenderness.   Skin: No rashes or open lesions noted.   MSK: No swelling. No abrasions or signs of trauma. Ambulating without assistance.   Neuro: Alert,oriented No focal deficits noted. Facial expressions even.   Psych: Cooperative, Normal affect      Procedures    Procedures     Labs     Results for orders placed or performed in visit on 02/12/24   Comprehensive Metabolic Panel   Result Value Ref Range    Sodium Level 139 136 - 145 mmol/L    Potassium Level 3.4 (L) 3.5 - 5.1 mmol/L    Chloride 105 98 - 107 mmol/L    Carbon Dioxide 23 22 - 29 mmol/L    Glucose Level 90 74 - 100 mg/dL    Blood Urea Nitrogen 9.3 7.0 - 18.7 mg/dL    Creatinine 0.72 0.55 - 1.02 mg/dL    Calcium Level  Total 9.2 8.4 - 10.2 mg/dL    Protein Total 6.9 6.4 - 8.3 gm/dL    Albumin Level 4.1 3.5 - 5.0 g/dL    Globulin 2.8 2.4 - 3.5 gm/dL    Albumin/Globulin Ratio 1.5 1.1 - 2.0 ratio    Bilirubin Total 0.6 <=1.5 mg/dL    Alkaline Phosphatase 48 40 - 150 unit/L    Alanine Aminotransferase 11 0 - 55 unit/L    Aspartate Aminotransferase 14 5 - 34 unit/L    eGFR >60 mls/min/1.73/m2   Lipid Panel   Result Value Ref Range    Cholesterol Total 157 <=200 mg/dL    HDL Cholesterol 56 35 - 60 mg/dL    Triglyceride 80 37 - 140 mg/dL    Cholesterol/HDL Ratio 3 0 - 5    Very Low Density Lipoprotein 16     LDL Cholesterol 85.00 50.00 - 140.00 mg/dL   TSH   Result Value Ref Range    TSH 0.762 0.350 - 4.940 uIU/mL   Hemoglobin A1C   Result Value Ref Range    Hemoglobin A1c 4.4 <=7.0 %    Estimated Average Glucose 79.6 mg/dL   Vitamin D   Result Value Ref Range    Vit D 25 OH 26.1 (L) 30.0 - 80.0 ng/mL   CBC with Differential   Result Value Ref Range    WBC 5.81 4.50 - 11.50 x10(3)/mcL    RBC 4.36 4.20 - 5.40 x10(6)/mcL    Hgb 13.9 12.0 - 16.0 g/dL    Hct 40.8 37.0 - 47.0 %    MCV 93.6 80.0 - 94.0 fL    MCH 31.9 (H) 27.0 - 31.0 pg    MCHC 34.1 33.0 - 36.0 g/dL    RDW 11.9 11.5 - 17.0 %    Platelet 261 130 - 400 x10(3)/mcL    MPV 11.2 (H) 7.4 - 10.4 fL    Neut % 52.4 %    Lymph % 34.9 %    Mono % 4.8 %    Eos % 6.9 %    Basophil % 0.7 %    Lymph # 2.03 0.6 - 4.6 x10(3)/mcL    Neut # 3.04 2.1 - 9.2 x10(3)/mcL    Mono # 0.28 0.1 - 1.3 x10(3)/mcL    Eos # 0.40 0 - 0.9 x10(3)/mcL    Baso # 0.04 <=0.2 x10(3)/mcL    IG# 0.02 0 - 0.04 x10(3)/mcL    IG% 0.3 %       Chemistry:  Lab Results   Component Value Date     02/12/2024    K 3.4 (L) 02/12/2024    CHLORIDE 105 02/12/2024    BUN 9.3 02/12/2024    CREATININE 0.72 02/12/2024    EGFRNORACEVR >60 02/12/2024    GLUCOSE 90 02/12/2024    CALCIUM 9.2 02/12/2024    ALKPHOS 48 02/12/2024    LABPROT 6.9 02/12/2024    ALBUMIN 4.1 02/12/2024    BILIDIR 0.13 05/12/2018    IBILI 0.27 05/12/2018    AST  14 02/12/2024    ALT 11 02/12/2024    [id number] 26.1 (L) 02/12/2024    TSH 0.762 02/12/2024    [id number] 0.86 05/23/2023        Lab Results   Component Value Date    HGBA1C 4.4 02/12/2024        Hematology:  Lab Results   Component Value Date    WBC 5.81 02/12/2024    HGB 13.9 02/12/2024    HCT 40.8 02/12/2024     02/12/2024       Lipid Panel:  Lab Results   Component Value Date    CHOL 157 02/12/2024    HDL 56 02/12/2024    LDL 85.00 02/12/2024    TRIG 80 02/12/2024    TOTALCHOLEST 3 02/12/2024        Urine:  Lab Results   Component Value Date    APPEARANCEUA Clear 09/08/2018    PROTEINUA Negative 09/08/2018    LEUKOCYTESUR Negative 08/21/2019    RBCUA None Seen 09/08/2018    WBCUA None Seen 09/08/2018    BACTERIA None Seen 09/08/2018         Assessment            ICD-10-CM ICD-9-CM   1. Wellness examination  Z00.00 V70.0   2. Seasonal allergies  J30.2 477.9   3. Neuropathy  G62.9 355.9   4. Attention deficit hyperactivity disorder (ADHD), unspecified ADHD type  F90.9 314.01   5. Depression, unspecified depression type  F32.A 311       Plan       1. Wellness examination  Discussed labs and preventative screenings   Overall health status reviewed.    Significant chronic conditions addressed, including ongoing treatment plans.   Good health habits reinforced.    Cardiovascular disease risk factors discussed.   Appropriate recommendations and preventative care medical   information provided with annual wellness exams encouraged.  Vaccination status       2. Seasonal allergies  - cetirizine (ZYRTEC) 10 MG tablet; Take 1 tablet (10 mg total) by mouth once daily.  Dispense: 30 tablet; Refill: 5  - fluticasone propionate (FLONASE) 50 mcg/actuation nasal spray; 1 spray (50 mcg total) by Each Nostril route once daily.  Dispense: 16 g; Refill: 0    3. Neuropathy  - Ambulatory referral/consult to Neurology; Future    4. Attention deficit hyperactivity disorder (ADHD), unspecified ADHD type  Denies SI, HI  hallucinations.  Establish good family, social support.  Readings, meditation, physical activity, prayer.  Continue current medication as prescribed per mental health provider.  ED precautions discussed.  5. Depression, unspecified depression type  Denies SI, HI hallucinations.  Establish good family, social support.  Readings, meditation, physical activity, prayer.  Continue current medication as prescribed per mental health provider.  ED precautions discussed.  Orders Placed This Encounter    Ambulatory referral/consult to Neurology    cetirizine (ZYRTEC) 10 MG tablet    fluticasone propionate (FLONASE) 50 mcg/actuation nasal spray    gabapentin (NEURONTIN) 100 MG capsule      Medication List with Changes/Refills   New Medications    GABAPENTIN (NEURONTIN) 100 MG CAPSULE    Take 1 capsule (100 mg total) by mouth 3 (three) times daily.   Current Medications    CLONAZEPAM (KLONOPIN) 0.5 MG TABLET    Take 0.5 tablets by mouth once daily.    DESVENLAFAXINE SUCCINATE (PRISTIQ) 100 MG TB24    Take 1 tablet by mouth once daily.    METHYLPHENIDATE HCL (RITALIN) 5 MG TABLET    Take 1 tablet by mouth 2 (two) times a day.    METHYLPHENIDATE HCL 72 MG TR24    Take 1 tablet by mouth once daily.    TOPIRAMATE (TOPAMAX) 25 MG TABLET       Changed and/or Refilled Medications    Modified Medication Previous Medication    CETIRIZINE (ZYRTEC) 10 MG TABLET cetirizine (ZYRTEC) 10 MG tablet       Take 1 tablet (10 mg total) by mouth once daily.    Take 1 tablet (10 mg total) by mouth once daily.    FLUTICASONE PROPIONATE (FLONASE) 50 MCG/ACTUATION NASAL SPRAY fluticasone propionate (FLONASE) 50 mcg/actuation nasal spray       1 spray (50 mcg total) by Each Nostril route once daily.    1 spray (50 mcg total) by Each Nostril route once daily.   Discontinued Medications    ACETYLCYSTEINE 600 MG CAP    Take 1,000 mg by mouth once daily.    BIOTIN 5,000 MCG TBDL    Take 1 tablet by mouth once daily.    CHOLECALCIFEROL, VITAMIN D3, 125 MCG  (5,000 UNIT) TAB    Take 5,000 Units by mouth once daily.    MECLIZINE (ANTIVERT) 12.5 MG TABLET    Take 1 tablet (12.5 mg total) by mouth 3 (three) times daily as needed for Dizziness.    MUPIROCIN (BACTROBAN) 2 % OINTMENT    Apply topically 3 (three) times daily.       Follow up in about 6 weeks (around 3/28/2024).   Follow up in about 6 weeks (around 3/28/2024). In addition to their scheduled follow up, the patient has also been instructed to follow up on as needed basis.   Future Appointments   Date Time Provider Department Center   3/28/2024  9:30 AM Rj Lieberman FNP [facility]   5/22/2024  1:15 PM Regulo Lemus MD [facility]

## 2024-02-19 ENCOUNTER — PATIENT MESSAGE (OUTPATIENT)
Dept: FAMILY MEDICINE | Facility: CLINIC | Age: 39
End: 2024-02-19
Payer: MEDICAID

## 2024-03-28 ENCOUNTER — OFFICE VISIT (OUTPATIENT)
Dept: FAMILY MEDICINE | Facility: CLINIC | Age: 39
End: 2024-03-28
Payer: MEDICAID

## 2024-03-28 VITALS
DIASTOLIC BLOOD PRESSURE: 76 MMHG | HEIGHT: 59 IN | RESPIRATION RATE: 17 BRPM | HEART RATE: 89 BPM | OXYGEN SATURATION: 99 % | WEIGHT: 130.63 LBS | TEMPERATURE: 99 F | SYSTOLIC BLOOD PRESSURE: 109 MMHG | BODY MASS INDEX: 26.33 KG/M2

## 2024-03-28 DIAGNOSIS — F32.A DEPRESSION, UNSPECIFIED DEPRESSION TYPE: ICD-10-CM

## 2024-03-28 DIAGNOSIS — M79.641 PAIN IN BOTH HANDS: ICD-10-CM

## 2024-03-28 DIAGNOSIS — G62.9 NEUROPATHY: Primary | ICD-10-CM

## 2024-03-28 DIAGNOSIS — M79.642 PAIN IN BOTH HANDS: ICD-10-CM

## 2024-03-28 PROCEDURE — 99214 OFFICE O/P EST MOD 30 MIN: CPT | Mod: ,,, | Performed by: NURSE PRACTITIONER

## 2024-03-28 PROCEDURE — 3078F DIAST BP <80 MM HG: CPT | Mod: CPTII,,, | Performed by: NURSE PRACTITIONER

## 2024-03-28 PROCEDURE — 1159F MED LIST DOCD IN RCRD: CPT | Mod: CPTII,,, | Performed by: NURSE PRACTITIONER

## 2024-03-28 PROCEDURE — 3074F SYST BP LT 130 MM HG: CPT | Mod: CPTII,,, | Performed by: NURSE PRACTITIONER

## 2024-03-28 PROCEDURE — 3044F HG A1C LEVEL LT 7.0%: CPT | Mod: CPTII,,, | Performed by: NURSE PRACTITIONER

## 2024-03-28 PROCEDURE — 3008F BODY MASS INDEX DOCD: CPT | Mod: CPTII,,, | Performed by: NURSE PRACTITIONER

## 2024-03-28 RX ORDER — GABAPENTIN 300 MG/1
300 CAPSULE ORAL 3 TIMES DAILY
Qty: 90 CAPSULE | Refills: 1 | Status: SHIPPED | OUTPATIENT
Start: 2024-03-28 | End: 2025-03-28

## 2024-03-28 RX ORDER — DEXAMETHASONE SODIUM PHOSPHATE 4 MG/ML
4 INJECTION, SOLUTION INTRA-ARTICULAR; INTRALESIONAL; INTRAMUSCULAR; INTRAVENOUS; SOFT TISSUE
Status: DISCONTINUED | OUTPATIENT
Start: 2024-03-28 | End: 2024-03-28

## 2024-03-28 RX ORDER — ACETAMINOPHEN, DIPHENHYDRAMINE HCL, PHENYLEPHRINE HCL 325; 25; 5 MG/1; MG/1; MG/1
20 TABLET ORAL NIGHTLY
COMMUNITY

## 2024-03-28 RX ORDER — CYANOCOBALAMIN 1000 UG/ML
1000 INJECTION, SOLUTION INTRAMUSCULAR; SUBCUTANEOUS
COMMUNITY

## 2024-03-28 RX ORDER — DEXAMETHASONE SODIUM PHOSPHATE 4 MG/ML
4 INJECTION, SOLUTION INTRA-ARTICULAR; INTRALESIONAL; INTRAMUSCULAR; INTRAVENOUS; SOFT TISSUE
Status: COMPLETED | OUTPATIENT
Start: 2024-03-28 | End: 2024-03-28

## 2024-03-28 RX ADMIN — DEXAMETHASONE SODIUM PHOSPHATE 4 MG: 4 INJECTION, SOLUTION INTRA-ARTICULAR; INTRALESIONAL; INTRAMUSCULAR; INTRAVENOUS; SOFT TISSUE at 10:03

## 2024-03-28 NOTE — PROGRESS NOTES
Patient given Decadron 4 mg IM in the right gluteal without difficulty.  Patient tolerated well without complaints.  S/W nurse at [facility] re neurology referral.  She states referral was closed two days ago as they thought patient would be seeing Dr Michaels as he did motor and sensory nerve study.  Patient only went to Dr Michaels for nerve study.  She asked us to send another referral and they would schedule patient and states they did have a copy of nerve study results.

## 2024-03-28 NOTE — PROGRESS NOTES
"  SUBJECTIVE:     History of Present Illness      Chief Complaint: Follow-up (6 week follow up visit for motor & sensory nerve study results from 03/20/24.  C/O left thigh pain and numbness with right side worsening greater than six weeks.  Gabapentin "takes the edge off".  States RLS no better, feels worsening.  Bilateral hand pain and numbness, worsening.)    HPI:  Patient is a 38 y.o. year old female who presents to clinic in numbness and tingling to her left thigh.  She is also complaining of similar feelings her bilateral wrists concerns of carpal tunnel.  Patient recently completed nerve conductive test with , she  now needs a new referral to treating neurologist at [facility] for further evaluation.  We will increase her gabapentin from 100 t.i.d. to 300 t.i.d.    Review of Systems:    Review of Systems    12 point review of systems conducted, negative except as stated in the history of present illness. See HPI for details.     Previous History    Rj Lieberman, JEANETTEP  Review of patient's allergies indicates:   Allergen Reactions    Brexpiprazole      Other Reaction(s): weight gain and binge eating    Cariprazine      Other Reaction(s): inner restlessness    Lamotrigine      Other reaction(s): Not Indicated    Lurasidone      Other Reaction(s): fatigue, felt funny    Sumatriptan      Other Reaction(s): 'sinuses, throat and skin burning'    Buspar [buspirone] Palpitations    Naltrexone Nausea Only       Past Medical History:   Diagnosis Date    ADHD (attention deficit hyperactivity disorder)     Anxiety     Bipolar disorder     Depression     Hypersomnia     PTSD (post-traumatic stress disorder)      Current Outpatient Medications   Medication Instructions    cetirizine (ZYRTEC) 10 mg, Oral, Daily    clonazePAM (KLONOPIN) 0.5 MG tablet 0.5 tablets, Oral, Daily    cyanocobalamin 1,000 mcg, Intramuscular, Every 7 days    desvenlafaxine succinate (PRISTIQ) 100 MG Tb24 1 tablet, Oral, Daily    fluticasone " "propionate (FLONASE) 50 mcg, Each Nostril, Daily    gabapentin (NEURONTIN) 300 mg, Oral, 3 times daily    melatonin 20 mg, Oral, Nightly    methylphenidate HCl (RITALIN) 5 MG tablet 1 tablet, Oral, 2 times daily    methylphenidate HCl 72 mg TR24 1 tablet, Oral, Daily    topiramate (TOPAMAX) 25 MG tablet No dose, route, or frequency recorded.     Past Surgical History:   Procedure Laterality Date    ADENOIDECTOMY       SECTION      CYST REMOVAL Right     right side of neck    TONSILLECTOMY      TUBAL LIGATION      uterine ablation N/A      Family History   Problem Relation Age of Onset    Hypertension Father        Social History     Tobacco Use    Smoking status: Former     Types: Cigarettes    Smokeless tobacco: Never   Substance Use Topics    Alcohol use: Yes     Comment: socially    Drug use: Never        Health Maintenance      Health Maintenance   Topic Date Due    TETANUS VACCINE  2024    Mammogram  2024    Hepatitis C Screening  Completed    Lipid Panel  Completed       OBJECTIVE:     Physical Exam      Vital Signs Reviewed   Visit Vitals  /76 (BP Location: Left arm, Patient Position: Sitting)   Pulse 89   Temp 98.9 °F (37.2 °C) (Oral)   Resp 17   Ht 4' 11" (1.499 m)   Wt 59.2 kg (130 lb 9.6 oz)   SpO2 99%   BMI 26.38 kg/m²       Physical Exam    Physical Exam:  General: Alert, well nourished, no acute distress, non-toxic appearing.   Eyes: Anicteric sclera, without conjunctival injection, normal lids, no purulent drainage, EOMs grossly intact.   Ears: No tragal tenderness. Tympanic membranes intact, pearly grey, without effusion or erythema and with a positive light reflex.   Mouth: Posterior pharynx without erythema. No exudate, ulcerations, or lesion. No tonsillar swelling.   Neck: Supple, full ROM, no rigidity, no cervical adenopathy.   Cardio: Normal rate and rhythm    Resp: Respirations even and unlabored, clear to auscultation bilaterally.   Abd: No ecchymosis or distension. " Normal bowel sounds in all 4 quadrants. No tenderness to palpation. No rebound tenderness or guarding. No CVA tenderness.   Skin: No rashes or open lesions noted.   MSK: No swelling. No abrasions or signs of trauma. Ambulating without assistance.   Neuro: Alert,oriented No focal deficits noted. Facial expressions even.   Psych: Cooperative, Normal affect          Labs     Results for orders placed or performed in visit on 02/12/24   Comprehensive Metabolic Panel   Result Value Ref Range    Sodium Level 139 136 - 145 mmol/L    Potassium Level 3.4 (L) 3.5 - 5.1 mmol/L    Chloride 105 98 - 107 mmol/L    Carbon Dioxide 23 22 - 29 mmol/L    Glucose Level 90 74 - 100 mg/dL    Blood Urea Nitrogen 9.3 7.0 - 18.7 mg/dL    Creatinine 0.72 0.55 - 1.02 mg/dL    Calcium Level Total 9.2 8.4 - 10.2 mg/dL    Protein Total 6.9 6.4 - 8.3 gm/dL    Albumin Level 4.1 3.5 - 5.0 g/dL    Globulin 2.8 2.4 - 3.5 gm/dL    Albumin/Globulin Ratio 1.5 1.1 - 2.0 ratio    Bilirubin Total 0.6 <=1.5 mg/dL    Alkaline Phosphatase 48 40 - 150 unit/L    Alanine Aminotransferase 11 0 - 55 unit/L    Aspartate Aminotransferase 14 5 - 34 unit/L    eGFR >60 mls/min/1.73/m2   Lipid Panel   Result Value Ref Range    Cholesterol Total 157 <=200 mg/dL    HDL Cholesterol 56 35 - 60 mg/dL    Triglyceride 80 37 - 140 mg/dL    Cholesterol/HDL Ratio 3 0 - 5    Very Low Density Lipoprotein 16     LDL Cholesterol 85.00 50.00 - 140.00 mg/dL   TSH   Result Value Ref Range    TSH 0.762 0.350 - 4.940 uIU/mL   Hemoglobin A1C   Result Value Ref Range    Hemoglobin A1c 4.4 <=7.0 %    Estimated Average Glucose 79.6 mg/dL   Vitamin D   Result Value Ref Range    Vit D 25 OH 26.1 (L) 30.0 - 80.0 ng/mL   CBC with Differential   Result Value Ref Range    WBC 5.81 4.50 - 11.50 x10(3)/mcL    RBC 4.36 4.20 - 5.40 x10(6)/mcL    Hgb 13.9 12.0 - 16.0 g/dL    Hct 40.8 37.0 - 47.0 %    MCV 93.6 80.0 - 94.0 fL    MCH 31.9 (H) 27.0 - 31.0 pg    MCHC 34.1 33.0 - 36.0 g/dL    RDW 11.9 11.5 -  17.0 %    Platelet 261 130 - 400 x10(3)/mcL    MPV 11.2 (H) 7.4 - 10.4 fL    Neut % 52.4 %    Lymph % 34.9 %    Mono % 4.8 %    Eos % 6.9 %    Basophil % 0.7 %    Lymph # 2.03 0.6 - 4.6 x10(3)/mcL    Neut # 3.04 2.1 - 9.2 x10(3)/mcL    Mono # 0.28 0.1 - 1.3 x10(3)/mcL    Eos # 0.40 0 - 0.9 x10(3)/mcL    Baso # 0.04 <=0.2 x10(3)/mcL    IG# 0.02 0 - 0.04 x10(3)/mcL    IG% 0.3 %       Chemistry:  Lab Results   Component Value Date     02/12/2024    K 3.4 (L) 02/12/2024    CHLORIDE 105 02/12/2024    BUN 9.3 02/12/2024    CREATININE 0.72 02/12/2024    EGFRNORACEVR >60 02/12/2024    GLUCOSE 90 02/12/2024    CALCIUM 9.2 02/12/2024    ALKPHOS 48 02/12/2024    LABPROT 6.9 02/12/2024    ALBUMIN 4.1 02/12/2024    BILIDIR 0.13 05/12/2018    IBILI 0.27 05/12/2018    AST 14 02/12/2024    ALT 11 02/12/2024    AFHQVQDI30YY 26.1 (L) 02/12/2024    TSH 0.762 02/12/2024    QZOJGY2GLJH 0.86 05/23/2023        Lab Results   Component Value Date    HGBA1C 4.4 02/12/2024        Hematology:  Lab Results   Component Value Date    WBC 5.81 02/12/2024    HGB 13.9 02/12/2024    HCT 40.8 02/12/2024     02/12/2024       Lipid Panel:  Lab Results   Component Value Date    CHOL 157 02/12/2024    HDL 56 02/12/2024    LDL 85.00 02/12/2024    TRIG 80 02/12/2024    TOTALCHOLEST 3 02/12/2024        Urine:  Lab Results   Component Value Date    APPEARANCEUA Clear 09/08/2018    PROTEINUA Negative 09/08/2018    LEUKOCYTESUR Negative 08/21/2019    RBCUA None Seen 09/08/2018    WBCUA None Seen 09/08/2018    BACTERIA None Seen 09/08/2018         Assessment            ICD-10-CM ICD-9-CM   1. Neuropathy  G62.9 355.9   2. Depression, unspecified depression type  F32.A 311   3. Pain in both hands  M79.641 729.5    M79.642        Plan       1. Neuropathy  No improvement  - increase gabapentin to 300 t.i.d..    Patient recently had nerve conduction testing  with Dr. Michaels   She has not followed up with neurologist at [facility] yet we will recent referral  2.  Depression, unspecified depression type  Stable denies SI, HI hallucinations.  Establish good family, social support.  Readings, meditation, physical activity, current medical medication management per mental health provider  3. Pain in both hands  Dexamethasone 4 mg IM  Orders Placed This Encounter    Ambulatory referral/consult to Neurology    gabapentin (NEURONTIN) 300 MG capsule    dexAMETHasone injection 4 mg      Medication List with Changes/Refills   Current Medications    CETIRIZINE (ZYRTEC) 10 MG TABLET    Take 1 tablet (10 mg total) by mouth once daily.    CLONAZEPAM (KLONOPIN) 0.5 MG TABLET    Take 0.5 tablets by mouth once daily.    CYANOCOBALAMIN 1,000 MCG/ML INJECTION    Inject 1,000 mcg into the muscle every 7 days.    DESVENLAFAXINE SUCCINATE (PRISTIQ) 100 MG TB24    Take 1 tablet by mouth once daily.    FLUTICASONE PROPIONATE (FLONASE) 50 MCG/ACTUATION NASAL SPRAY    1 spray (50 mcg total) by Each Nostril route once daily.    MELATONIN 10 MG TAB    Take 20 mg by mouth nightly.    METHYLPHENIDATE HCL (RITALIN) 5 MG TABLET    Take 1 tablet by mouth 2 (two) times a day.    METHYLPHENIDATE HCL 72 MG TR24    Take 1 tablet by mouth once daily.    TOPIRAMATE (TOPAMAX) 25 MG TABLET       Changed and/or Refilled Medications    Modified Medication Previous Medication    GABAPENTIN (NEURONTIN) 300 MG CAPSULE gabapentin (NEURONTIN) 100 MG capsule       Take 1 capsule (300 mg total) by mouth 3 (three) times daily.    Take 1 capsule (100 mg total) by mouth 3 (three) times daily.       Follow up in about 6 weeks (around 5/9/2024), or if symptoms worsen or fail to improve, for neuropathy follow up.   Follow up in about 6 weeks (around 5/9/2024), or if symptoms worsen or fail to improve, for neuropathy follow up. In addition to their scheduled follow up, the patient has also been instructed to follow up on as needed basis.   Future Appointments   Date Time Provider Department Center   5/9/2024  8:30 AM Luisana  [facility]   5/22/2024  1:15 PM Regulo Lemus MD [facility]   2/17/2025  9:00 AM Rj Lieberman FNP [facility]

## 2024-04-10 ENCOUNTER — PATIENT MESSAGE (OUTPATIENT)
Dept: FAMILY MEDICINE | Facility: CLINIC | Age: 39
End: 2024-04-10
Payer: MEDICAID

## 2024-04-22 ENCOUNTER — TELEPHONE (OUTPATIENT)
Dept: NEUROLOGY | Facility: CLINIC | Age: 39
End: 2024-04-22
Payer: MEDICAID

## 2024-04-22 NOTE — TELEPHONE ENCOUNTER
Good morning    A neurology referral was received and per our provider review the referral will be accepted for neuropathy and the patient contacted with an appointment. Currently scheduling new patients in Dec/Nov 2024.    Per our provider-patient needs referral to [facility] for carpal tunnel syndorme.     Thank you

## 2024-05-08 ENCOUNTER — OFFICE VISIT (OUTPATIENT)
Dept: FAMILY MEDICINE | Facility: CLINIC | Age: 39
End: 2024-05-08
Payer: MEDICAID

## 2024-05-08 ENCOUNTER — LAB VISIT (OUTPATIENT)
Dept: LAB | Facility: HOSPITAL | Age: 39
End: 2024-05-08
Attending: NURSE PRACTITIONER
Payer: MEDICAID

## 2024-05-08 VITALS
OXYGEN SATURATION: 99 % | HEART RATE: 87 BPM | HEIGHT: 59 IN | BODY MASS INDEX: 26.08 KG/M2 | RESPIRATION RATE: 16 BRPM | SYSTOLIC BLOOD PRESSURE: 115 MMHG | WEIGHT: 129.38 LBS | DIASTOLIC BLOOD PRESSURE: 81 MMHG | TEMPERATURE: 98 F

## 2024-05-08 DIAGNOSIS — D22.9 ATYPICAL MOLE: ICD-10-CM

## 2024-05-08 DIAGNOSIS — M25.50 ARTHRALGIA, UNSPECIFIED JOINT: ICD-10-CM

## 2024-05-08 DIAGNOSIS — G62.9 NEUROPATHY: ICD-10-CM

## 2024-05-08 DIAGNOSIS — M79.642 PAIN IN BOTH HANDS: ICD-10-CM

## 2024-05-08 DIAGNOSIS — M79.641 PAIN IN BOTH HANDS: ICD-10-CM

## 2024-05-08 DIAGNOSIS — M25.50 ARTHRALGIA, UNSPECIFIED JOINT: Primary | ICD-10-CM

## 2024-05-08 LAB
C4 SERPL-MCNC: 30 MG/DL (ref 13–46)
CRP SERPL-MCNC: 1 MG/L
ERYTHROCYTE [SEDIMENTATION RATE] IN BLOOD: 15 MM/HR (ref 0–20)
RHEUMATOID FACT SERPL-ACNC: <13 IU/ML
URATE SERPL-MCNC: 4.5 MG/DL (ref 2.6–6)

## 2024-05-08 PROCEDURE — 99213 OFFICE O/P EST LOW 20 MIN: CPT | Mod: ,,, | Performed by: NURSE PRACTITIONER

## 2024-05-08 PROCEDURE — 86431 RHEUMATOID FACTOR QUANT: CPT | Mod: 59

## 2024-05-08 PROCEDURE — 85652 RBC SED RATE AUTOMATED: CPT

## 2024-05-08 PROCEDURE — 36415 COLL VENOUS BLD VENIPUNCTURE: CPT

## 2024-05-08 PROCEDURE — 1159F MED LIST DOCD IN RCRD: CPT | Mod: CPTII,,, | Performed by: NURSE PRACTITIONER

## 2024-05-08 PROCEDURE — 84550 ASSAY OF BLOOD/URIC ACID: CPT

## 2024-05-08 PROCEDURE — 86200 CCP ANTIBODY: CPT

## 2024-05-08 PROCEDURE — 3079F DIAST BP 80-89 MM HG: CPT | Mod: CPTII,,, | Performed by: NURSE PRACTITIONER

## 2024-05-08 PROCEDURE — 3044F HG A1C LEVEL LT 7.0%: CPT | Mod: CPTII,,, | Performed by: NURSE PRACTITIONER

## 2024-05-08 PROCEDURE — 86431 RHEUMATOID FACTOR QUANT: CPT

## 2024-05-08 PROCEDURE — 86140 C-REACTIVE PROTEIN: CPT

## 2024-05-08 PROCEDURE — 3074F SYST BP LT 130 MM HG: CPT | Mod: CPTII,,, | Performed by: NURSE PRACTITIONER

## 2024-05-08 PROCEDURE — 3008F BODY MASS INDEX DOCD: CPT | Mod: CPTII,,, | Performed by: NURSE PRACTITIONER

## 2024-05-08 PROCEDURE — 86160 COMPLEMENT ANTIGEN: CPT

## 2024-05-08 NOTE — PROGRESS NOTES
SUBJECTIVE:     History of Present Illness      Chief Complaint: Follow-up (Follow up visit for neuropathy.  Gabapentin helping with RLS & bilateral leg numbness.  Episodes not as often.  Has appointment with Cielo Agustin neuro 11/22/24 [facility] as NP.  Now having painful sensations behind bilateral knees.  Right wrist pain with movement x 3 years.  Mole to right leg near knee and middle of right lower leg, raised area)    HPI:  Patient is a 38 y.o. year old female who presents to clinic for follow-up ongoing bilateral lower extremity pain and tingling.  Patient also complaining of bilateral wrist pain ongoing for the last 3 years.  The study with no evidence of neuropathy still neurology appointment with [facility] for further evaluation.    Patient complaining abnormal moles warts to her lower extremity.  Review of Systems:    Review of Systems    12 point review of systems conducted, negative except as stated in the history of present illness. See HPI for details.     Previous History    Rj Lieberman, JEANETTEP  Review of patient's allergies indicates:   Allergen Reactions    Brexpiprazole      Other Reaction(s): weight gain and binge eating    Cariprazine      Other Reaction(s): inner restlessness    Lamotrigine      Other reaction(s): Not Indicated    Lurasidone      Other Reaction(s): fatigue, felt funny    Sumatriptan      Other Reaction(s): 'sinuses, throat and skin burning'    Buspar [buspirone] Palpitations    Naltrexone Nausea Only       Past Medical History:   Diagnosis Date    ADHD (attention deficit hyperactivity disorder)     Anxiety     Bipolar disorder     Depression     Hypersomnia     PTSD (post-traumatic stress disorder)      Current Outpatient Medications   Medication Instructions    cetirizine (ZYRTEC) 10 mg, Oral, Daily    clonazePAM (KLONOPIN) 0.5 MG tablet 0.5 tablets, Oral, Daily    cyanocobalamin 1,000 mcg, Intramuscular, Every 7 days    desvenlafaxine succinate (PRISTIQ) 100 MG Tb24 1 tablet,  "Oral, Daily    fluticasone propionate (FLONASE) 50 mcg, Each Nostril, Daily    gabapentin (NEURONTIN) 300 mg, Oral, 3 times daily    melatonin 20 mg, Oral, Nightly    methylphenidate HCl (RITALIN) 5 MG tablet 1 tablet, Oral, 2 times daily    methylphenidate HCl 72 mg TR24 1 tablet, Oral, Daily    topiramate (TOPAMAX) 25 MG tablet No dose, route, or frequency recorded.     Past Surgical History:   Procedure Laterality Date    ADENOIDECTOMY       SECTION      CYST REMOVAL Right     right side of neck    TONSILLECTOMY      TUBAL LIGATION      uterine ablation N/A      Family History   Problem Relation Name Age of Onset    Hypertension Father         Social History     Tobacco Use    Smoking status: Former     Types: Cigarettes    Smokeless tobacco: Never   Substance Use Topics    Alcohol use: Yes     Comment: socially    Drug use: Never        Health Maintenance      Health Maintenance   Topic Date Due    TETANUS VACCINE  2024    Mammogram  2024    Hepatitis C Screening  Completed    Lipid Panel  Completed       OBJECTIVE:     Physical Exam      Vital Signs Reviewed   Visit Vitals  /81 (BP Location: Left arm, Patient Position: Sitting)   Pulse 87   Temp 98.3 °F (36.8 °C) (Oral)   Resp 16   Ht 4' 11" (1.499 m)   Wt 58.7 kg (129 lb 6.4 oz)   SpO2 99%   BMI 26.14 kg/m²       Physical Exam    Physical Exam:  General: Alert, well nourished, no acute distress, non-toxic appearing.   Eyes: Anicteric sclera, without conjunctival injection, normal lids, no purulent drainage, EOMs grossly intact.   Ears: No tragal tenderness. Tympanic membranes intact, pearly grey, without effusion or erythema and with a positive light reflex.   Mouth: Posterior pharynx without erythema. No exudate, ulcerations, or lesion. No tonsillar swelling.   Neck: Supple, full ROM, no rigidity, no cervical adenopathy.   Cardio: Normal rate and rhythm    Resp: Respirations even and unlabored, clear to auscultation bilaterally. "   Abd: No ecchymosis or distension. Normal bowel sounds in all 4 quadrants. No tenderness to palpation. No rebound tenderness or guarding. No CVA tenderness.   Skin:  Irregular border and changing in size Abnormal skin growth to right lower extremity MSK: No swelling. abrasions or signs of trauma. Ambulating without assistance.   Neuro: Alert,oriented No focal deficits noted. Facial expressions even.   Psych: Cooperative, Normal affect      Procedures    Procedures     Labs     Results for orders placed or performed in visit on 02/12/24   Comprehensive Metabolic Panel   Result Value Ref Range    Sodium Level 139 136 - 145 mmol/L    Potassium Level 3.4 (L) 3.5 - 5.1 mmol/L    Chloride 105 98 - 107 mmol/L    Carbon Dioxide 23 22 - 29 mmol/L    Glucose Level 90 74 - 100 mg/dL    Blood Urea Nitrogen 9.3 7.0 - 18.7 mg/dL    Creatinine 0.72 0.55 - 1.02 mg/dL    Calcium Level Total 9.2 8.4 - 10.2 mg/dL    Protein Total 6.9 6.4 - 8.3 gm/dL    Albumin Level 4.1 3.5 - 5.0 g/dL    Globulin 2.8 2.4 - 3.5 gm/dL    Albumin/Globulin Ratio 1.5 1.1 - 2.0 ratio    Bilirubin Total 0.6 <=1.5 mg/dL    Alkaline Phosphatase 48 40 - 150 unit/L    Alanine Aminotransferase 11 0 - 55 unit/L    Aspartate Aminotransferase 14 5 - 34 unit/L    eGFR >60 mls/min/1.73/m2   Lipid Panel   Result Value Ref Range    Cholesterol Total 157 <=200 mg/dL    HDL Cholesterol 56 35 - 60 mg/dL    Triglyceride 80 37 - 140 mg/dL    Cholesterol/HDL Ratio 3 0 - 5    Very Low Density Lipoprotein 16     LDL Cholesterol 85.00 50.00 - 140.00 mg/dL   TSH   Result Value Ref Range    TSH 0.762 0.350 - 4.940 uIU/mL   Hemoglobin A1C   Result Value Ref Range    Hemoglobin A1c 4.4 <=7.0 %    Estimated Average Glucose 79.6 mg/dL   Vitamin D   Result Value Ref Range    Vit D 25 OH 26.1 (L) 30.0 - 80.0 ng/mL   CBC with Differential   Result Value Ref Range    WBC 5.81 4.50 - 11.50 x10(3)/mcL    RBC 4.36 4.20 - 5.40 x10(6)/mcL    Hgb 13.9 12.0 - 16.0 g/dL    Hct 40.8 37.0 - 47.0 %     MCV 93.6 80.0 - 94.0 fL    MCH 31.9 (H) 27.0 - 31.0 pg    MCHC 34.1 33.0 - 36.0 g/dL    RDW 11.9 11.5 - 17.0 %    Platelet 261 130 - 400 x10(3)/mcL    MPV 11.2 (H) 7.4 - 10.4 fL    Neut % 52.4 %    Lymph % 34.9 %    Mono % 4.8 %    Eos % 6.9 %    Basophil % 0.7 %    Lymph # 2.03 0.6 - 4.6 x10(3)/mcL    Neut # 3.04 2.1 - 9.2 x10(3)/mcL    Mono # 0.28 0.1 - 1.3 x10(3)/mcL    Eos # 0.40 0 - 0.9 x10(3)/mcL    Baso # 0.04 <=0.2 x10(3)/mcL    IG# 0.02 0 - 0.04 x10(3)/mcL    IG% 0.3 %       Chemistry:  Lab Results   Component Value Date     02/12/2024    K 3.4 (L) 02/12/2024    CHLORIDE 105 02/12/2024    BUN 9.3 02/12/2024    CREATININE 0.72 02/12/2024    EGFRNORACEVR >60 02/12/2024    GLUCOSE 90 02/12/2024    CALCIUM 9.2 02/12/2024    ALKPHOS 48 02/12/2024    LABPROT 6.9 02/12/2024    ALBUMIN 4.1 02/12/2024    BILIDIR 0.13 05/12/2018    IBILI 0.27 05/12/2018    AST 14 02/12/2024    ALT 11 02/12/2024    [id number] 26.1 (L) 02/12/2024    TSH 0.762 02/12/2024    [id number] 0.86 05/23/2023        Lab Results   Component Value Date    HGBA1C 4.4 02/12/2024        Hematology:  Lab Results   Component Value Date    WBC 5.81 02/12/2024    HGB 13.9 02/12/2024    HCT 40.8 02/12/2024     02/12/2024       Lipid Panel:  Lab Results   Component Value Date    CHOL 157 02/12/2024    HDL 56 02/12/2024    LDL 85.00 02/12/2024    TRIG 80 02/12/2024    TOTALCHOLEST 3 02/12/2024        Urine:  Lab Results   Component Value Date    APPEARANCEUA Clear 09/08/2018    PROTEINUA Negative 09/08/2018    LEUKOCYTESUR Negative 08/21/2019    RBCUA None Seen 09/08/2018    WBCUA None Seen 09/08/2018    BACTERIA None Seen 09/08/2018         Assessment            ICD-10-CM ICD-9-CM   1. Arthralgia, unspecified joint  M25.50 719.40   2. Pain in both hands  M79.641 729.5    M79.642    3. Neuropathy  G62.9 355.9   4. Atypical mole  D22.9 216.9       Plan       1. Arthralgia, unspecified joint  - Ambulatory referral/consult to Rheumatology;  Future  - Rheumatoid Factors, IgA, IgG, IgM; Future  - Sedimentation rate; Future  - C-Reactive Protein; Future  - CYCLIC CITRULLINATED PEPTIDE (CCP) ANTIBODY; Future  - Uric Acid; Future  - C4 Complement; Future    2. Pain in both hands  - Ambulatory referral/consult to Rheumatology; Future  - Rheumatoid Factors, IgA, IgG, IgM; Future  - Sedimentation rate; Future  - C-Reactive Protein; Future  - CYCLIC CITRULLINATED PEPTIDE (CCP) ANTIBODY; Future  - Uric Acid; Future  - C4 Complement; Future    3. Neuropathy  - Ambulatory referral/consult to Rheumatology; Future  - Rheumatoid Factors, IgA, IgG, IgM; Future  - Sedimentation rate; Future  - C-Reactive Protein; Future  - CYCLIC CITRULLINATED PEPTIDE (CCP) ANTIBODY; Future  - Uric Acid; Future  - C4 Complement; Future    4. Atypical mole  - Ambulatory referral/consult to Dermatology; Future    Orders Placed This Encounter    Rheumatoid Factors, IgA, IgG, IgM    Sedimentation rate    C-Reactive Protein    CYCLIC CITRULLINATED PEPTIDE (CCP) ANTIBODY    Uric Acid    C4 Complement    Ambulatory referral/consult to Rheumatology    Ambulatory referral/consult to Dermatology      Medication List with Changes/Refills   Current Medications    CETIRIZINE (ZYRTEC) 10 MG TABLET    Take 1 tablet (10 mg total) by mouth once daily.    CLONAZEPAM (KLONOPIN) 0.5 MG TABLET    Take 0.5 tablets by mouth once daily.    CYANOCOBALAMIN 1,000 MCG/ML INJECTION    Inject 1,000 mcg into the muscle every 7 days.    DESVENLAFAXINE SUCCINATE (PRISTIQ) 100 MG TB24    Take 1 tablet by mouth once daily.    FLUTICASONE PROPIONATE (FLONASE) 50 MCG/ACTUATION NASAL SPRAY    1 spray (50 mcg total) by Each Nostril route once daily.    GABAPENTIN (NEURONTIN) 300 MG CAPSULE    Take 1 capsule (300 mg total) by mouth 3 (three) times daily.    MELATONIN 10 MG TAB    Take 20 mg by mouth nightly.    METHYLPHENIDATE HCL (RITALIN) 5 MG TABLET    Take 1 tablet by mouth 2 (two) times a day.    METHYLPHENIDATE HCL 72  MG TR24    Take 1 tablet by mouth once daily.    TOPIRAMATE (TOPAMAX) 25 MG TABLET           Follow up in about 6 weeks (around 6/19/2024), or if symptoms worsen or fail to improve.   Follow up in about 6 weeks (around 6/19/2024), or if symptoms worsen or fail to improve. In addition to their scheduled follow up, the patient has also been instructed to follow up on as needed basis.   Future Appointments   Date Time Provider Department Center   5/22/2024  1:15 PM Regulo Lemus MD [facility]   11/22/2024 11:00 AM Cielo Agustin MD [facility] [place]    2/17/2025  9:00 AM Rj Lieberman FNP [facility]

## 2024-05-12 LAB
CYCLIC CITRULLINATED PEPTIDE (CCP) (OHS): 0.6 U/ML
RHEUMATOID FACTOR IGA QUANTITATIVE (OLG): 6.6 IU/ML
RHEUMATOID FACTOR IGM QUANTITATIVE (OLG): 3.2 IU/ML

## 2024-05-20 ENCOUNTER — TELEPHONE (OUTPATIENT)
Dept: FAMILY MEDICINE | Facility: CLINIC | Age: 39
End: 2024-05-20
Payer: MEDICAID

## 2024-05-20 NOTE — TELEPHONE ENCOUNTER
----- Message from ELLA Cabello sent at 5/17/2024 10:54 AM CDT -----  Please inform patient of all labs within acceptable ranges no evidence of arthritis from lab work  - no change in treatment required.    We are still pending Rheumatology referral - if she has not heard from RA clinic in the next 2 -3 weeks please notify office to avoid any delays in care

## 2024-05-20 NOTE — TELEPHONE ENCOUNTER
Patient khris gomez November of next 2025- marialuisa see if she can get in there . Due to the time for khris gomez

## 2024-05-29 ENCOUNTER — HOSPITAL ENCOUNTER (OUTPATIENT)
Dept: RADIOLOGY | Facility: HOSPITAL | Age: 39
Discharge: HOME | End: 2024-05-29
Attending: OBSTETRICS & GYNECOLOGY
Payer: MEDICAID

## 2024-05-29 DIAGNOSIS — R10.2 PELVIC PAIN: ICD-10-CM

## 2024-05-29 PROCEDURE — 76856 US EXAM PELVIC COMPLETE: CPT | Mod: TC

## 2024-07-03 ENCOUNTER — HOSPITAL ENCOUNTER (OUTPATIENT)
Dept: RADIOLOGY | Facility: HOSPITAL | Age: 39
Discharge: HOME | End: 2024-07-03
Attending: OBSTETRICS & GYNECOLOGY
Payer: MEDICAID

## 2024-07-03 DIAGNOSIS — Z12.31 ENCOUNTER FOR SCREENING MAMMOGRAM FOR MALIGNANT NEOPLASM OF BREAST: ICD-10-CM

## 2024-07-03 DIAGNOSIS — G62.9 NEUROPATHY: ICD-10-CM

## 2024-07-03 PROCEDURE — 77067 SCR MAMMO BI INCL CAD: CPT | Mod: 26,,, | Performed by: STUDENT IN AN ORGANIZED HEALTH CARE EDUCATION/TRAINING PROGRAM

## 2024-07-03 PROCEDURE — 77067 SCR MAMMO BI INCL CAD: CPT | Mod: TC

## 2024-07-03 PROCEDURE — 77063 BREAST TOMOSYNTHESIS BI: CPT | Mod: 26,,, | Performed by: STUDENT IN AN ORGANIZED HEALTH CARE EDUCATION/TRAINING PROGRAM

## 2024-07-03 RX ORDER — GABAPENTIN 300 MG/1
300 CAPSULE ORAL 3 TIMES DAILY
Qty: 90 CAPSULE | Refills: 1 | Status: SHIPPED | OUTPATIENT
Start: 2024-07-03

## 2024-11-07 DIAGNOSIS — G62.9 NEUROPATHY: ICD-10-CM

## 2024-11-08 RX ORDER — GABAPENTIN 300 MG/1
300 CAPSULE ORAL 3 TIMES DAILY
Qty: 90 CAPSULE | Refills: 1 | Status: SHIPPED | OUTPATIENT
Start: 2024-11-08

## 2024-11-20 NOTE — PROGRESS NOTES
[facility] Neurology Initial Office Visit Note    Initial Visit Date: 11/22/2024  Current Visit Date:  11/20/2024    Chief Complaint:     Chief Complaint   Patient presents with    Numbness     Patient state she has a lot of numbness in her right wrist on down. Patient states she also has a lot of pain associated with the numbness as well. Patient states she has restless legs mostly when she sleep. She states lately the left foot has been worst.        History of Present Illness:      This is 39 y.o. female with history of ADHD, depression, who is referred for paresthesia for at least 4 years. 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 She reports that since she had stopped working around 2 years ago, and since then, she complains of worsening right wrist pain and right 4-5 digit numbness and paresthesia. She states that she habitually sleeps with her arm and wrist flexed at night. She also noted right knee would swell at times and hurt along with numbness in the popliteal region ever since she injured it 4 years ago while exercising. She reports chronic lower back pain since the age of 17.     She also note that she is very restless at night, which has gotten worse over the past few years. On occasion, she needed gabapentin 600 mg at bedtime to help with the restless.  She has erratic dietary habits since she is a child. She would crave for ice chips and salty food at times. She states that she was told that she had a remote history of iron deficiency. She has been topiramate for the past 1 year for weight control.     As per patient, her father has RLS.       Medications:     Current Outpatient Medications on File Prior to Visit   Medication Sig Dispense Refill    cetirizine  (ZYRTEC) 10 MG tablet Take 1 tablet (10 mg total) by mouth once daily. 30 tablet 5    clonazePAM (KLONOPIN) 0.5 MG tablet Take 0.5 tablets by mouth once daily.      cyanocobalamin 1,000 mcg/mL injection Inject 1,000 mcg into the muscle every 7 days.      desvenlafaxine succinate (PRISTIQ) 100 MG Tb24 Take 1 tablet by mouth once daily.      ergocalciferol (VITAMIN D2) 50,000 unit Cap Take one capsule twice a week 8 capsule 2    fluticasone propionate (FLONASE) 50 mcg/actuation nasal spray 1 spray (50 mcg total) by Each Nostril route once daily. 16 g 0    gabapentin (NEURONTIN) 300 MG capsule Take 1 capsule (300 mg total) by mouth 3 (three) times daily. 90 capsule 1    melatonin 10 mg Tab Take 20 mg by mouth nightly.      methylphenidate HCl (RITALIN) 5 MG tablet Take 1 tablet by mouth 2 (two) times a day.      methylphenidate HCl 72 mg TR24 Take 1 tablet by mouth once daily.      topiramate (TOPAMAX) 25 MG tablet        No current facility-administered medications on file prior to visit.       Labs:     Results for orders placed or performed in visit on 05/22/24   POCT urine pregnancy    Collection Time: 05/22/24  1:56 PM   Result Value Ref Range    POC Preg Test, Ur Negative Negative     Acceptable Yes        Studies:      NCS/EMG all extremities 3/4/2024 with Dr. Michaels: normal.     Review of Systems:     Review of Systems   All other systems reviewed and are negative.      Physical Exams:     Vitals:    11/22/24 1117   BP: 112/75   Pulse: 93   Resp: 18   Temp: 97.6 °F (36.4 °C)       Physical Exam  Vitals and nursing note reviewed.   Constitutional:       Appearance: Normal appearance.   HENT:      Head: Normocephalic and atraumatic.      Nose: Nose normal.      Mouth/Throat:      Mouth: Mucous membranes are moist.      Pharynx: Oropharynx is clear.   Eyes:      Conjunctiva/sclera: Conjunctivae normal.   Cardiovascular:      Rate and Rhythm: Normal rate and regular rhythm.      Pulses: Normal pulses.    Pulmonary:      Effort: Pulmonary effort is normal.      Breath sounds: Normal breath sounds.   Abdominal:      General: Abdomen is flat.   Musculoskeletal:         General: Normal range of motion.      Cervical back: Normal range of motion.   Skin:     General: Skin is warm.   Neurological:      Mental Status: She is alert.         Comprehensive Neurological Exam:  Mental Status: Alert Oriented to Self, Date, and Place.  Comprehension wnl. No dysarthria.   CN II - XII: FELIX, No APD,, VFFC, No ptosis OU, EOMI without nystagmus, LT/Temp symmetric in CN V1-3 distribution, Hearing grossly intact, Face Symmetric, Tongue and Uvula midline, Trapezius symmetric bilateral.   Motor: tone and bulk wnl throughout, no abnormal involuntary or voluntary movements, 5/5 to confrontation, Fine finger movements wnl b/l, No pronator drift.   Sensory: LT, Proprioception, Vibration, PP, Temp symmetric.   Reflexes: 2+ throughout, plantar reflexes downward bilateral.   Cerebellar: FNF wnl b/l, RAHM wnl b/l  Romberg: Negative  Gait: normal.    Elbow Tinel's present on the right.     Assessment:     This is 39 y.o. female with history of  ADHD, depression, who is referred for compressive right ulnar neuropathy due to poor sleep posture and restless leg syndrome. Will need to rule out iron deficiency related RLS.       Problem List Items Addressed This Visit          Neuro    Restless leg syndrome    Relevant Orders    Vitamin B12    Folate    Iron and TIBC    Ferritin       Oncology    Anemia     Other Visit Diagnoses       Ulnar neuropathy at elbow, right    -  Primary            Plan:     [] labs today   [] reassurance provide     RTC 4 month telemed.      Visit today is associated with current or anticipated ongoing medical care related to this patient's single serious condition/complex condition as documented above.     I have explained the treatment plan, diagnosis, and prognosis to patient. All questions are answered to the best of  my knowledge.     Face to face time 60 minutes, including documentation, chart review, counseling, education, review of test results, relevant medical records, and coordination of care.   I have explained the treatment plan, diagnosis, and prognosis to patient. All questions are answered to the best of my knowledge.       Cielo Agustin MD   General Neurology  11/22/2024

## 2024-11-22 ENCOUNTER — OFFICE VISIT (OUTPATIENT)
Dept: NEUROLOGY | Facility: CLINIC | Age: 39
End: 2024-11-22
Payer: MEDICAID

## 2024-11-22 VITALS
HEART RATE: 93 BPM | HEIGHT: 59 IN | DIASTOLIC BLOOD PRESSURE: 75 MMHG | WEIGHT: 128.81 LBS | BODY MASS INDEX: 25.97 KG/M2 | SYSTOLIC BLOOD PRESSURE: 112 MMHG | RESPIRATION RATE: 18 BRPM | TEMPERATURE: 98 F | OXYGEN SATURATION: 99 %

## 2024-11-22 DIAGNOSIS — G25.81 RESTLESS LEG SYNDROME: ICD-10-CM

## 2024-11-22 DIAGNOSIS — D64.9 ANEMIA, UNSPECIFIED TYPE: ICD-10-CM

## 2024-11-22 DIAGNOSIS — G56.21 ULNAR NEUROPATHY AT ELBOW, RIGHT: Primary | ICD-10-CM

## 2024-11-22 PROCEDURE — 99214 OFFICE O/P EST MOD 30 MIN: CPT | Mod: PBBFAC | Performed by: PSYCHIATRY & NEUROLOGY

## 2024-11-22 RX ORDER — SERDEXMETHYLPHENIDATE AND DEXMETHYLPHENIDATE 10.4; 52.3 MG/1; MG/1
CAPSULE ORAL DAILY
COMMUNITY
Start: 2024-10-02

## 2024-11-22 RX ORDER — BUTALBITAL, ACETAMINOPHEN AND CAFFEINE 50; 325; 40 MG/1; MG/1; MG/1
CAPSULE ORAL
COMMUNITY
Start: 2024-05-24

## 2024-12-16 ENCOUNTER — LAB VISIT (OUTPATIENT)
Dept: LAB | Facility: HOSPITAL | Age: 39
End: 2024-12-16
Attending: PSYCHIATRY & NEUROLOGY
Payer: MEDICAID

## 2024-12-16 DIAGNOSIS — G25.81 RESTLESS LEG SYNDROME: ICD-10-CM

## 2024-12-16 LAB
FERRITIN SERPL-MCNC: 158.3 NG/ML (ref 4.63–204)
FOLATE SERPL-MCNC: 9.2 NG/ML (ref 7–31.4)
IRON SATN MFR SERPL: 41 % (ref 20–50)
IRON SERPL-MCNC: 106 UG/DL (ref 50–170)
TIBC SERPL-MCNC: 151 UG/DL (ref 70–310)
TIBC SERPL-MCNC: 257 UG/DL (ref 250–450)
TRANSFERRIN SERPL-MCNC: 225 MG/DL (ref 180–382)
VIT B12 SERPL-MCNC: 949 PG/ML (ref 213–816)

## 2024-12-16 PROCEDURE — 36415 COLL VENOUS BLD VENIPUNCTURE: CPT

## 2024-12-16 PROCEDURE — 82746 ASSAY OF FOLIC ACID SERUM: CPT

## 2024-12-16 PROCEDURE — 82607 VITAMIN B-12: CPT

## 2024-12-16 PROCEDURE — 82728 ASSAY OF FERRITIN: CPT

## 2024-12-16 PROCEDURE — 83550 IRON BINDING TEST: CPT

## 2025-01-02 ENCOUNTER — PATIENT MESSAGE (OUTPATIENT)
Dept: NEUROLOGY | Facility: CLINIC | Age: 40
End: 2025-01-02
Payer: MEDICAID

## 2025-01-27 DIAGNOSIS — J30.2 SEASONAL ALLERGIES: ICD-10-CM

## 2025-01-27 RX ORDER — CETIRIZINE HYDROCHLORIDE 10 MG/1
10 TABLET ORAL DAILY
Qty: 30 TABLET | Refills: 5 | Status: SHIPPED | OUTPATIENT
Start: 2025-01-27 | End: 2026-01-27

## 2025-02-10 DIAGNOSIS — Z00.00 WELLNESS EXAMINATION: Primary | ICD-10-CM

## 2025-03-20 NOTE — PROGRESS NOTES
[facility] Neurology Follow Up Telemedicine Visit Note    Initial Visit Date: 11/22/2024  Last Visit Date: 11/22/2024  Current Visit Date:  03/24/2025    Chief Complaint:     Chief Complaint   Patient presents with    Ulnar Neuropathy at elbow     Symptoms are better.  Not getting as numb but does have a cold sensation.       History of Present Illness:      This is a real-time audio/video visit that was performed with the originating site at patient's home and the distant site, [facility]. Verbal consent to participate in interactive audio & video visit was obtained.    I discussed with the patient regarding the nature of our telehealth visits, that:    - Our sessions are not being recorded and that personal health information is protected  - Provider would evaluate the patient and recommend diagnostics and treatments based on my assessment  - [facility] will provide follow up care in person if/when the patient needs it.     This is a 39 y.o. female with history of ADHD, depression, who is referred for compressive right ulnar neuropathy due to poor sleep posture and restless leg syndrome. Will need to rule out iron deficiency related RLS. During last visit, iron panel and ferritin level was ordered.    Interim History  Symptoms have improved. Taking Gabapentin 600 mg at bedtime for RLS and feels extremely drowsy during the day.     Presenting History  This is 39 y.o. female with history of ADHD, depression, who is referred for paresthesia for at least 4 years. 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 She reports that since she had stopped working around 2 years ago, and since then, she complains of worsening  right wrist pain and right 4-5 digit numbness and paresthesia. She states that she habitually sleeps with her arm and wrist flexed at night. She also noted right knee would swell at times and hurt along with numbness in the popliteal region ever since she injured it 4 years ago while exercising. She reports chronic lower back pain since the age of 17.     She also note that she is very restless at night, which has gotten worse over the past few years. On occasion, she needed gabapentin 600 mg at bedtime to help with the restless.  She has erratic dietary habits since she is a child. She would crave for ice chips and salty food at times. She states that she was told that she had a remote history of iron deficiency. She has been topiramate for the past 1 year for weight control.     As per patient, her father has RLS.       Medications:     Current Outpatient Medications on File Prior to Visit   Medication Sig Dispense Refill    acetaminophen (TYLENOL) 325 mg Cap Tylenol      AZSTARYS 52.3 mg- 10.4 mg Cap once daily.      butalbital-acetaminophen-caff -40 mg -40 mg Cap as needed.      cetirizine (ZYRTEC) 10 MG tablet Take 1 tablet (10 mg total) by mouth once daily. 30 tablet 5    clonazePAM (KLONOPIN) 0.5 MG tablet Take 0.5 tablets by mouth once daily.      desvenlafaxine succinate (PRISTIQ) 100 MG Tb24 Take 1 tablet by mouth once daily.      fluticasone propionate (FLONASE) 50 mcg/actuation nasal spray 1 spray (50 mcg total) by Each Nostril route once daily. 16 g 0    gabapentin (NEURONTIN) 300 MG capsule Take 1 capsule (300 mg total) by mouth 3 (three) times daily. (Patient taking differently: Take 300 mg by mouth 3 (three) times daily. Taking 2 daily) 90 capsule 1    melatonin 10 mg Tab Take 20 mg by mouth nightly.      topiramate (TOPAMAX) 25 MG tablet        No current facility-administered medications on file prior to visit.       Labs:     Results for orders placed or performed in visit on 12/16/24    Vitamin B12    Collection Time: 12/16/24  9:31 AM   Result Value Ref Range    Vitamin B12 949 (H) 213 - 816 pg/mL   Folate    Collection Time: 12/16/24  9:31 AM   Result Value Ref Range    Folate Level 9.2 7.0 - 31.4 ng/mL   Iron and TIBC    Collection Time: 12/16/24  9:31 AM   Result Value Ref Range    Iron Binding Capacity Unsaturated 151 70 - 310 ug/dL    Iron Level 106 50 - 170 ug/dL    Transferrin 225 180 - 382 mg/dL    Iron Binding Capacity Total 257 250 - 450 ug/dL    Iron Saturation 41 20 - 50 %   Ferritin    Collection Time: 12/16/24  9:31 AM   Result Value Ref Range    Ferritin Level 158.30 4.63 - 204.00 ng/mL       Studies:      NCS/EMG all extremities 3/4/2024 with Dr. Michaels: normal.     Review of Systems:     Review of Systems   All other systems reviewed and are negative.      Physical Exams:     Physical Exam  Nursing note reviewed.   Constitutional:       Appearance: Normal appearance.   HENT:      Head: Atraumatic.   Pulmonary:      Effort: Pulmonary effort is normal.   Musculoskeletal:         General: Normal range of motion.      Cervical back: Normal range of motion.   Neurological:      Mental Status: She is alert.       Telemedicine Comprehensive Neurological Exam:  Mental Status: Alert Oriented to Self, Date, and Place.  Comprehension wnl. No dysarthria.   CN II - XII: FELIX, VA grossly intact, No ptosis OU, EOMI without nystagmus, Hearing grossly intact, Face Symmetric, Tongue and Uvula midline, Trapezius symmetric bilateral.   Motor: no abnormal involuntary or voluntary movements, Fine finger movements wnl b/l, No pronator drift.   Sensory: unable to assess.  Reflexes: unable to assess.   Cerebellar: RAHM wnl b/l.  Gait: normal.      Assessment:     This is 39 y.o. female with history of  ADHD, depression, who is referred for compressive right ulnar neuropathy due to poor sleep posture and restless leg syndrome. Symptoms improving with conservative management.       Problem List Items  Addressed This Visit          Neuro    Restless leg syndrome - Primary     Other Visit Diagnoses         Ulnar neuropathy at elbow, right                Plan:     [] reassurance provided   [] continue with gabapentin     RTC PRN    I have explained the treatment plan, diagnosis, and prognosis to patient. All questions are answered to the best of my knowledge.     Face to face time 30 minutes, including documentation, chart review, counseling, education, review of test results, relevant medical records, and coordination of care.   I have explained the treatment plan, diagnosis, and prognosis to patient. All questions are answered to the best of my knowledge.       Cielo Agustin MD   General Neurology  03/24/2025

## 2025-03-24 ENCOUNTER — OFFICE VISIT (OUTPATIENT)
Dept: NEUROLOGY | Facility: CLINIC | Age: 40
End: 2025-03-24
Payer: MEDICAID

## 2025-03-24 DIAGNOSIS — G56.21 ULNAR NEUROPATHY AT ELBOW, RIGHT: Primary | ICD-10-CM

## 2025-03-24 DIAGNOSIS — G25.81 RESTLESS LEG SYNDROME: ICD-10-CM

## 2025-03-24 RX ORDER — GUAIFENESIN 1200 MG
TABLET, EXTENDED RELEASE 12 HR ORAL
COMMUNITY

## 2025-03-24 RX ORDER — GABAPENTIN 300 MG/1
300 CAPSULE ORAL 3 TIMES DAILY
Qty: 270 CAPSULE | Refills: 1 | Status: SHIPPED | OUTPATIENT
Start: 2025-03-24 | End: 2025-09-20

## 2025-05-01 ENCOUNTER — OFFICE VISIT (OUTPATIENT)
Dept: FAMILY MEDICINE | Facility: CLINIC | Age: 40
End: 2025-05-01
Payer: MEDICAID

## 2025-05-01 ENCOUNTER — LAB VISIT (OUTPATIENT)
Dept: LAB | Facility: HOSPITAL | Age: 40
End: 2025-05-01
Attending: NURSE PRACTITIONER
Payer: MEDICAID

## 2025-05-01 VITALS
OXYGEN SATURATION: 98 % | HEIGHT: 59 IN | DIASTOLIC BLOOD PRESSURE: 86 MMHG | BODY MASS INDEX: 23.93 KG/M2 | SYSTOLIC BLOOD PRESSURE: 124 MMHG | RESPIRATION RATE: 18 BRPM | WEIGHT: 118.69 LBS | HEART RATE: 77 BPM | TEMPERATURE: 98 F

## 2025-05-01 DIAGNOSIS — Z00.00 WELLNESS EXAMINATION: ICD-10-CM

## 2025-05-01 DIAGNOSIS — Z00.00 WELLNESS EXAMINATION: Primary | ICD-10-CM

## 2025-05-01 DIAGNOSIS — Z12.31 BREAST CANCER SCREENING BY MAMMOGRAM: ICD-10-CM

## 2025-05-01 DIAGNOSIS — F90.9 ATTENTION DEFICIT HYPERACTIVITY DISORDER (ADHD), UNSPECIFIED ADHD TYPE: ICD-10-CM

## 2025-05-01 DIAGNOSIS — G62.9 NEUROPATHY: ICD-10-CM

## 2025-05-01 DIAGNOSIS — F32.A DEPRESSION, UNSPECIFIED DEPRESSION TYPE: ICD-10-CM

## 2025-05-01 LAB
25(OH)D3+25(OH)D2 SERPL-MCNC: 22 NG/ML (ref 30–80)
ALBUMIN SERPL-MCNC: 4.3 G/DL (ref 3.5–5)
ALBUMIN/GLOB SERPL: 1.5 RATIO (ref 1.1–2)
ALP SERPL-CCNC: 50 UNIT/L (ref 40–150)
ALT SERPL-CCNC: 19 UNIT/L (ref 0–55)
ANION GAP SERPL CALC-SCNC: 6 MEQ/L
AST SERPL-CCNC: 19 UNIT/L (ref 11–45)
BASOPHILS # BLD AUTO: 0.04 X10(3)/MCL
BASOPHILS NFR BLD AUTO: 0.9 %
BILIRUB SERPL-MCNC: 0.8 MG/DL
BUN SERPL-MCNC: 9.5 MG/DL (ref 7–18.7)
CALCIUM SERPL-MCNC: 9.3 MG/DL (ref 8.4–10.2)
CHLORIDE SERPL-SCNC: 109 MMOL/L (ref 98–107)
CHOLEST SERPL-MCNC: 176 MG/DL
CHOLEST/HDLC SERPL: 3 {RATIO} (ref 0–5)
CO2 SERPL-SCNC: 25 MMOL/L (ref 22–29)
CREAT SERPL-MCNC: 0.77 MG/DL (ref 0.55–1.02)
CREAT/UREA NIT SERPL: 12
EOSINOPHIL # BLD AUTO: 0.36 X10(3)/MCL (ref 0–0.9)
EOSINOPHIL NFR BLD AUTO: 8.2 %
ERYTHROCYTE [DISTWIDTH] IN BLOOD BY AUTOMATED COUNT: 11.7 % (ref 11.5–17)
EST. AVERAGE GLUCOSE BLD GHB EST-MCNC: 79.6 MG/DL
GFR SERPLBLD CREATININE-BSD FMLA CKD-EPI: >60 ML/MIN/1.73/M2
GLOBULIN SER-MCNC: 2.9 GM/DL (ref 2.4–3.5)
GLUCOSE SERPL-MCNC: 89 MG/DL (ref 74–100)
HBA1C MFR BLD: 4.4 %
HCT VFR BLD AUTO: 41.9 % (ref 37–47)
HDLC SERPL-MCNC: 64 MG/DL (ref 35–60)
HGB BLD-MCNC: 14.4 G/DL (ref 12–16)
IMM GRANULOCYTES # BLD AUTO: 0.01 X10(3)/MCL (ref 0–0.04)
IMM GRANULOCYTES NFR BLD AUTO: 0.2 %
LDLC SERPL CALC-MCNC: 98 MG/DL (ref 50–140)
LYMPHOCYTES # BLD AUTO: 1.95 X10(3)/MCL (ref 0.6–4.6)
LYMPHOCYTES NFR BLD AUTO: 44.4 %
MCH RBC QN AUTO: 33.2 PG (ref 27–31)
MCHC RBC AUTO-ENTMCNC: 34.4 G/DL (ref 33–36)
MCV RBC AUTO: 96.5 FL (ref 80–94)
MONOCYTES # BLD AUTO: 0.34 X10(3)/MCL (ref 0.1–1.3)
MONOCYTES NFR BLD AUTO: 7.7 %
NEUTROPHILS # BLD AUTO: 1.69 X10(3)/MCL (ref 2.1–9.2)
NEUTROPHILS NFR BLD AUTO: 38.6 %
PLATELET # BLD AUTO: 272 X10(3)/MCL (ref 130–400)
PMV BLD AUTO: 10.6 FL (ref 7.4–10.4)
POTASSIUM SERPL-SCNC: 3.8 MMOL/L (ref 3.5–5.1)
PROT SERPL-MCNC: 7.2 GM/DL (ref 6.4–8.3)
RBC # BLD AUTO: 4.34 X10(6)/MCL (ref 4.2–5.4)
SODIUM SERPL-SCNC: 140 MMOL/L (ref 136–145)
TRIGL SERPL-MCNC: 69 MG/DL (ref 37–140)
TSH SERPL-ACNC: 1.87 UIU/ML (ref 0.35–4.94)
VLDLC SERPL CALC-MCNC: 14 MG/DL
WBC # BLD AUTO: 4.39 X10(3)/MCL (ref 4.5–11.5)

## 2025-05-01 PROCEDURE — 80053 COMPREHEN METABOLIC PANEL: CPT

## 2025-05-01 PROCEDURE — 80061 LIPID PANEL: CPT

## 2025-05-01 PROCEDURE — 84443 ASSAY THYROID STIM HORMONE: CPT

## 2025-05-01 PROCEDURE — 83036 HEMOGLOBIN GLYCOSYLATED A1C: CPT

## 2025-05-01 PROCEDURE — 36415 COLL VENOUS BLD VENIPUNCTURE: CPT

## 2025-05-01 PROCEDURE — 85025 COMPLETE CBC W/AUTO DIFF WBC: CPT

## 2025-05-01 PROCEDURE — 82306 VITAMIN D 25 HYDROXY: CPT

## 2025-05-01 RX ORDER — MODAFINIL 100 MG/1
TABLET ORAL
COMMUNITY
Start: 2025-04-02

## 2025-05-01 NOTE — ASSESSMENT & PLAN NOTE
Recent medication change per mental health  Patient has a follow up in 3 weeks.   Denies SI, HI hallucinations.   Establish good family/social support, reading, meditation, physical activity exercise.  Avoid/limit caffeine, alcohol and stimulants.  Practice positive phrases  and relaxation techniques.  Set healthy boundaries, avoid people and conversations that increase stress.  ED precautions discussed

## 2025-05-01 NOTE — PROGRESS NOTES
SUBJECTIVE:     History of Present Illness      Chief Complaint: Annual Exam    HPI:  Patient is a 39 y.o. year old female who presents to clinic for annual wellness and lab review. Medical problems include depression , hypersomia, ADHD. She follow up with mental health.    Pt wears glasses last eye exam with one year. Former smoker quit over 2 years ago.       Review of Systems:    Review of Systems    12 point review of systems conducted, negative except as stated in the history of present illness. See HPI for details.     Previous History      PCP: Rj Lieberman FNP  Review of patient's allergies indicates:   Allergen Reactions    Brexpiprazole      Other Reaction(s): weight gain and binge eating    Cariprazine      Other Reaction(s): inner restlessness    Lamotrigine      Other reaction(s): Not Indicated    Lurasidone      Other Reaction(s): fatigue, felt funny    Sumatriptan      Other Reaction(s): 'sinuses, throat and skin burning'    Buspar [buspirone] Palpitations    Naltrexone Nausea Only       Past Medical History:   Diagnosis Date    ADHD (attention deficit hyperactivity disorder)     Anxiety     Bipolar disorder     Depression     Hypersomnia     PTSD (post-traumatic stress disorder)        Past Surgical History:   Procedure Laterality Date    ADENOIDECTOMY       SECTION      CYST REMOVAL Right     right side of neck    TONSILLECTOMY      TUBAL LIGATION      uterine ablation N/A      Family History   Problem Relation Name Age of Onset    Hypertension Father         Social History[1]     Health Maintenance      Health Maintenance   Topic Date Due    TETANUS VACCINE  2024    COVID-19 Vaccine ( - - season) Never done    Mammogram  2025    Influenza Vaccine (Season Ended) 2025    Cervical Cancer Screening  2026    RSV Vaccine (Age 60+ and Pregnant patients) (1 - 1-dose 75+ series) 2058    Hepatitis C Screening  Completed    HIV Screening  Completed    Lipid  "Panel  Completed    Pneumococcal Vaccines (Age 0-49)  Aged Out       OBJECTIVE:     Physical Exam      Vital Signs Reviewed   Visit Vitals  /86 (BP Location: Right arm, Patient Position: Sitting)   Pulse 77   Temp 98 °F (36.7 °C)   Resp 18   Ht 4' 11" (1.499 m)   Wt 53.8 kg (118 lb 11.2 oz)   SpO2 98%   BMI 23.97 kg/m²       Physical Exam    Physical Exam:  General: Alert, well nourished, no acute distress, non-toxic appearing.   Eyes: Anicteric sclera, without conjunctival injection, normal lids, no purulent drainage, EOMs grossly intact.   Ears: No tragal tenderness. Tympanic membranes intact, pearly grey, without effusion or erythema and with a positive light reflex.   Mouth: Posterior pharynx without erythema. No exudate, ulcerations, or lesion. No tonsillar swelling.   Neck: Supple, full ROM, no rigidity, no cervical adenopathy.   Cardio: Normal rate and rhythm    Resp: Respirations even and unlabored, clear to auscultation bilaterally.   Abd: No ecchymosis or distension. Normal bowel sounds in all 4 quadrants. No tenderness to palpation. No rebound tenderness or guarding. No CVA tenderness.   Skin: No rashes or open lesions noted.   MSK: No swelling. No abrasions or signs of trauma. Ambulating without assistance.   Neuro: Alert,oriented No focal deficits noted. Facial expressions even.   Psych: Cooperative, Normal affect      Labs   Chemistry:  Lab Results   Component Value Date     05/01/2025    K 3.8 05/01/2025    BUN 9.5 05/01/2025    CREATININE 0.77 05/01/2025    EGFRNORACEVR >60 05/01/2025    CALCIUM 9.3 05/01/2025    ALKPHOS 50 05/01/2025    ALBUMIN 4.3 05/01/2025    BILIDIR 0.13 05/12/2018    IBILI 0.27 05/12/2018    AST 19 05/01/2025    ALT 19 05/01/2025    LJHOANIC55OY 22 (L) 05/01/2025    TSH 1.869 05/01/2025    NPABDU8PHID 0.86 05/23/2023        Lab Results   Component Value Date    HGBA1C 4.4 05/01/2025        Hematology:  Lab Results   Component Value Date    WBC 4.39 (L) 05/01/2025    " HGB 14.4 05/01/2025    HCT 41.9 05/01/2025     05/01/2025       Lipid Panel:  Lab Results   Component Value Date    CHOL 176 05/01/2025    HDL 64 (H) 05/01/2025    LDL 98.00 05/01/2025    TRIG 69 05/01/2025    TOTALCHOLEST 3 05/01/2025        Urine:  Lab Results   Component Value Date    APPEARANCEUA Clear 09/08/2018    PROTEINUA Negative 09/08/2018    LEUKOCYTESUR Negative 08/21/2019    RBCUA None Seen 09/08/2018    WBCUA None Seen 09/08/2018    BACTERIA None Seen 09/08/2018         Assessment         ICD-10-CM ICD-9-CM   1. Wellness examination  Z00.00 V70.0   2. Neuropathy  G62.9 355.9   3. Depression, unspecified depression type  F32.A 311   4. Attention deficit hyperactivity disorder (ADHD), unspecified ADHD type  F90.9 314.01   5. Breast cancer screening by mammogram  Z12.31 V76.12       Plan       Assessment & Plan  Wellness examination  Discussed labs and preventative screenings   Overall health status reviewed.    Significant chronic conditions addressed, including ongoing treatment plans.   Good health habits reinforced.    Cardiovascular disease risk factors discussed.   Appropriate recommendations and preventative care medical   information provided with annual wellness exams encouraged.  Vaccination status   Mammo UTD due July   Cervical            Neuropathy  Improved          Depression, unspecified depression type  Recent medication change per mental health  Patient has a follow up in 3 weeks.   Denies SI, HI hallucinations.   Establish good family/social support, reading, meditation, physical activity exercise.  Avoid/limit caffeine, alcohol and stimulants.  Practice positive phrases  and relaxation techniques.  Set healthy boundaries, avoid people and conversations that increase stress.  ED precautions discussed           Attention deficit hyperactivity disorder (ADHD), unspecified ADHD type  Stable continue medication per mental health provider         Breast cancer screening by  mammogram  UTD repeat in July                 Medication List with Changes/Refills   Current Medications    ACETAMINOPHEN (TYLENOL) 325 MG CAP    Tylenol    AZSTARYS 52.3 MG- 10.4 MG CAP    once daily.    BUTALBITAL-ACETAMINOPHEN-CAFF -40 MG -40 MG CAP    as needed.    CETIRIZINE (ZYRTEC) 10 MG TABLET    Take 1 tablet (10 mg total) by mouth once daily.    CLONAZEPAM (KLONOPIN) 0.5 MG TABLET    Take 0.5 tablets by mouth once daily.    DESVENLAFAXINE SUCCINATE (PRISTIQ) 100 MG TB24    Take 1 tablet by mouth once daily.    FLUTICASONE PROPIONATE (FLONASE) 50 MCG/ACTUATION NASAL SPRAY    1 spray (50 mcg total) by Each Nostril route once daily.    GABAPENTIN (NEURONTIN) 300 MG CAPSULE    Take 1 capsule (300 mg total) by mouth 3 (three) times daily.    MELATONIN 10 MG TAB    Take 20 mg by mouth nightly.    MODAFINIL (PROVIGIL) 100 MG TAB        TOPIRAMATE (TOPAMAX) 25 MG TABLET             Follow up in about 3 months (around 8/1/2025), or if symptoms worsen or fail to improve, for Virtual. In addition to their scheduled follow up, the patient has also been instructed to follow up on as needed basis.   Future Appointments   Date Time Provider Department Center   6/4/2025  1:30 PM Regulo Lemus MD [facility]   8/1/2025  8:00 AM Rj Lieberman FNP [facility] [place] [facility]   11/10/2025 10:00 AM Paula Bright MD [facility]   5/7/2026 10:00 AM Rj Lieberman FNP [facility]       ELLA Lund                [1]   Social History  Tobacco Use    Smoking status: Former     Types: Cigarettes    Smokeless tobacco: Never   Substance Use Topics    Alcohol use: Yes     Comment: socially    Drug use: Never

## 2025-06-04 PROCEDURE — 87624 HPV HI-RISK TYP POOLED RSLT: CPT | Performed by: OBSTETRICS & GYNECOLOGY

## 2025-06-06 ENCOUNTER — HOSPITAL ENCOUNTER (OUTPATIENT)
Dept: RADIOLOGY | Facility: HOSPITAL | Age: 40
Discharge: HOME | End: 2025-06-06
Attending: OBSTETRICS & GYNECOLOGY
Payer: MEDICAID

## 2025-06-06 DIAGNOSIS — R10.2 PELVIC PAIN: ICD-10-CM

## 2025-06-06 PROCEDURE — 76856 US EXAM PELVIC COMPLETE: CPT | Mod: TC

## 2025-06-13 NOTE — PROGRESS NOTES
"  Outpatient Rehab    Physical Therapy Visit    Patient Name: MEET Bryant  MRN: [id number]  YOB: 1972  Encounter Date: 6/13/2025    Therapy Diagnosis:   Encounter Diagnoses   Name Primary?    Degeneration of intervertebral disc of lumbar region with discogenic back pain and lower extremity pain Yes    Lumbar radiculopathy     Weakness of left lower extremity      Physician: Vijaya Reyes MD    Physician Orders: Eval and Treat  Medical Diagnosis: Degeneration of intervertebral disc of lumbar region with discogenic back pain and lower extremity pain  Lumbar radiculopathy, chronic  Surgical Diagnosis: Not applicable for this Episode   Surgical Date: Not applicable for this Episode    Visit # / Visits Authorized:  1 / 20  Insurance Authorization Period: 6/7/2025 to 12/31/2025  Date of Evaluation: 6/7/2025  Plan of Care Certification: 6/7/2025 to 8/30/2025      PT/PTA:     Number of PTA visits since last PT visit:   Time In: 1520   Time Out: 1615  Total Time (in minutes): 55   Total Billable Time (in minutes): 50    FOTO:  Intake Score:  %  Survey Score 2:  %  Survey Score 3:  %    Precautions:       Subjective   Her pain is doing okay today with minimal issues at work..  Pain reported as 3/10.      Objective            Treatment:     CPT Intervention Performed   Today Duration / Intensity   MT  Belt hip MOBS x  Lateral inferior lateral with Movement     TE  Upright Bike  x  8 mins      Prone Quad Stretch  x  30s x 3 on left      Kneeling hip flexor stretch  x  30s x 3 each     Hip 90/90  x 5 second hold 5x each hip    Single leg Step ups x 12" box 2x10     Seated Knee Ext matrix x 35# 3x10 15# 2x10 single leg    NMR PPT x 10x cueing to prepare for bridges      Bridges x  3x10       Sidelying hip abduction x  2x10 RTB     Prone Femoral Nerve GLides x 20x    Clamshells x 2x10 RTB               TA                                                                               PLAN  Upright bike, Hip " Please inform patient of all labs within acceptable ranges no evidence of arthritis from lab work  - no change in treatment required.    We are still pending Rheumatology referral - if she has not heard from RA clinic in the next 2 -3 weeks please notify office to avoid any delays in care 90/90, Prone quad stretch, Hip flexor stretch, Bridges, SL hip abduction, clamshells, Seated knee ext matrix,leg press, Femoral nerve glide sidelying, banded squats, lateral band walks.              CPT Codes available for Billing:   (10) minutes of Manual therapy (MT) to improve pain and ROM.  (25) minutes of Therapeutic Exercise (TE) to develop strength, endurance, range of motion, and flexibility.  (15) minutes of Neuromuscular Re-Education (NMR)  to improve: Balance, Coordination, Kinesthetic, Sense, Proprioception, and Posture.  (00) minutes of Therapeutic Activities (TA) to improve functional performance.  Vasopneumatic Device Therapy () for management of swelling/edema. (32109)  Unattended Electrical Stimulation (ES) for muscle performance or pain modulation.  BFR: Blood flow restriction applied during exercise     Time Entry(in minutes):       Assessment & Plan   Assessment: Patient tolerated all treatment well and was able to complete program as noted for initial treatment session. Cues for all interventions for correct technique and to avoid substitution patterns. Encouraged HEP  Evaluation/Treatment Tolerance: Patient tolerated treatment well    The patient will continue to benefit from skilled outpatient physical therapy in order to address the deficits listed in the problem list on the initial evaluation, provide patient and family education, and maximize the patients level of independence in the home and community environments.     The patient's spiritual, cultural, and educational needs were considered, and the patient is agreeable to the plan of care and goals.           Plan: Continue Plan of Care (POC) and progress per patient tolerance. See treatment section for details on planned progressions next session.    Goals:   Active       LTG       Pt will report worst pain of 1/10 in order to progress toward max functional ability and improve quality of life                Start:  06/07/25     Expected End:  08/30/25            Patient will improve AROM to stated goals, listed in objective measures above, in order to return to maximal functional potential and improve quality of life.        Start:  06/07/25    Expected End:  08/30/25            Patient will improve strength to at least 4+/5 grossly,  in order to improve functional independence and quality of life.         Start:  06/07/25    Expected End:  08/30/25            Patient will demonstrate an improve quad LSI of 90% or greater to improve functional strength and QOL.       Start:  06/07/25    Expected End:  08/30/25            Patient will be able to perform lunges and other work outs without having lateral thigh numbness to improve participation in peer related activities.        Start:  06/07/25    Expected End:  08/30/25               STG       Pt will report worst pain of 3/10 in order to progress toward max functional ability and improve quality of life                Start:  06/07/25    Expected End:  07/19/25            Patient will improve AROM to 50% of stated goals, listed in objective measures above, in order to progress towards independence with functional activities.         Start:  06/07/25    Expected End:  07/19/25            Patient will improve strength by 1/2 a grade, in order to progress towards independence with functional activities.                Start:  06/07/25    Expected End:  07/19/25            Patient will demonstrate independence with HEP in order to progress toward functional independence.        Start:  06/07/25    Expected End:  07/19/25                Jaswinder Quiles, PT

## 2025-07-09 ENCOUNTER — HOSPITAL ENCOUNTER (OUTPATIENT)
Dept: RADIOLOGY | Facility: HOSPITAL | Age: 40
Discharge: HOME | End: 2025-07-09
Attending: OBSTETRICS & GYNECOLOGY
Payer: MEDICAID

## 2025-07-09 DIAGNOSIS — Z12.31 ENCOUNTER FOR SCREENING MAMMOGRAM FOR MALIGNANT NEOPLASM OF BREAST: ICD-10-CM

## 2025-07-09 PROCEDURE — 77063 BREAST TOMOSYNTHESIS BI: CPT | Mod: TC

## 2025-07-22 ENCOUNTER — TELEPHONE (OUTPATIENT)
Dept: RADIOLOGY | Facility: HOSPITAL | Age: 40
End: 2025-07-22
Payer: MEDICAID

## 2025-07-28 ENCOUNTER — PATIENT MESSAGE (OUTPATIENT)
Dept: FAMILY MEDICINE | Facility: CLINIC | Age: 40
End: 2025-07-28
Payer: MEDICAID

## 2025-08-01 ENCOUNTER — OFFICE VISIT (OUTPATIENT)
Dept: FAMILY MEDICINE | Facility: CLINIC | Age: 40
End: 2025-08-01
Payer: MEDICAID

## 2025-08-01 ENCOUNTER — HOSPITAL ENCOUNTER (OUTPATIENT)
Dept: RADIOLOGY | Facility: HOSPITAL | Age: 40
Discharge: HOME | End: 2025-08-01
Attending: NURSE PRACTITIONER
Payer: MEDICAID

## 2025-08-01 VITALS
DIASTOLIC BLOOD PRESSURE: 71 MMHG | SYSTOLIC BLOOD PRESSURE: 102 MMHG | RESPIRATION RATE: 18 BRPM | HEIGHT: 59 IN | HEART RATE: 86 BPM | TEMPERATURE: 98 F | OXYGEN SATURATION: 100 % | WEIGHT: 123.5 LBS | BODY MASS INDEX: 24.9 KG/M2

## 2025-08-01 DIAGNOSIS — M25.531 RIGHT WRIST PAIN: Primary | ICD-10-CM

## 2025-08-01 DIAGNOSIS — M25.531 RIGHT WRIST PAIN: ICD-10-CM

## 2025-08-01 PROCEDURE — 73110 X-RAY EXAM OF WRIST: CPT | Mod: TC,RT

## 2025-08-01 RX ORDER — PREDNISONE 5 MG/1
5 TABLET ORAL DAILY
Qty: 7 TABLET | Refills: 0 | Status: SHIPPED | OUTPATIENT
Start: 2025-08-01

## 2025-08-05 DIAGNOSIS — J30.2 SEASONAL ALLERGIES: ICD-10-CM

## 2025-08-05 RX ORDER — CETIRIZINE HYDROCHLORIDE 10 MG/1
10 TABLET ORAL
Qty: 30 TABLET | Refills: 5 | Status: SHIPPED | OUTPATIENT
Start: 2025-08-05

## 2025-08-22 ENCOUNTER — HOSPITAL ENCOUNTER (OUTPATIENT)
Dept: RADIOLOGY | Facility: HOSPITAL | Age: 40
Discharge: HOME | End: 2025-08-22
Attending: OBSTETRICS & GYNECOLOGY
Payer: MEDICAID

## 2025-08-22 DIAGNOSIS — R92.8 ABNORMAL SCREENING MAMMOGRAM: ICD-10-CM

## 2025-08-22 PROCEDURE — 76642 ULTRASOUND BREAST LIMITED: CPT | Mod: 26,RT,, | Performed by: RADIOLOGY

## 2025-08-22 PROCEDURE — 77061 BREAST TOMOSYNTHESIS UNI: CPT | Mod: 26,RT,, | Performed by: RADIOLOGY

## 2025-08-22 PROCEDURE — 77065 DX MAMMO INCL CAD UNI: CPT | Mod: 26,RT,, | Performed by: RADIOLOGY

## 2025-08-22 PROCEDURE — 77061 BREAST TOMOSYNTHESIS UNI: CPT | Mod: TC,RT

## 2025-08-22 PROCEDURE — 76642 ULTRASOUND BREAST LIMITED: CPT | Mod: TC,RT

## 2025-09-04 ENCOUNTER — OFFICE VISIT (OUTPATIENT)
Dept: FAMILY MEDICINE | Facility: CLINIC | Age: 40
End: 2025-09-04
Payer: MEDICAID

## 2025-09-04 DIAGNOSIS — Z00.00 WELLNESS EXAMINATION: ICD-10-CM

## 2025-09-04 DIAGNOSIS — M25.531 RIGHT WRIST PAIN: Primary | ICD-10-CM
